# Patient Record
Sex: FEMALE | Race: WHITE | NOT HISPANIC OR LATINO | Employment: PART TIME | URBAN - NONMETROPOLITAN AREA
[De-identification: names, ages, dates, MRNs, and addresses within clinical notes are randomized per-mention and may not be internally consistent; named-entity substitution may affect disease eponyms.]

---

## 2020-05-06 ENCOUNTER — LAB (OUTPATIENT)
Dept: LAB | Facility: HOSPITAL | Age: 65
End: 2020-05-06

## 2020-05-06 ENCOUNTER — OFFICE VISIT (OUTPATIENT)
Dept: FAMILY MEDICINE CLINIC | Facility: CLINIC | Age: 65
End: 2020-05-06

## 2020-05-06 ENCOUNTER — TELEPHONE (OUTPATIENT)
Dept: FAMILY MEDICINE CLINIC | Facility: CLINIC | Age: 65
End: 2020-05-06

## 2020-05-06 VITALS
BODY MASS INDEX: 35.49 KG/M2 | SYSTOLIC BLOOD PRESSURE: 140 MMHG | DIASTOLIC BLOOD PRESSURE: 82 MMHG | WEIGHT: 200.3 LBS | HEIGHT: 63 IN | HEART RATE: 62 BPM | OXYGEN SATURATION: 97 %

## 2020-05-06 DIAGNOSIS — Z01.419 WELL WOMAN EXAM: ICD-10-CM

## 2020-05-06 DIAGNOSIS — N95.9 POSTMENOPAUSAL SYMPTOMS: ICD-10-CM

## 2020-05-06 DIAGNOSIS — L29.9 ITCHING: ICD-10-CM

## 2020-05-06 DIAGNOSIS — Z12.31 ENCOUNTER FOR SCREENING MAMMOGRAM FOR MALIGNANT NEOPLASM OF BREAST: ICD-10-CM

## 2020-05-06 DIAGNOSIS — G62.9 NEUROPATHY: ICD-10-CM

## 2020-05-06 DIAGNOSIS — M54.40 CHRONIC BILATERAL LOW BACK PAIN WITH SCIATICA, SCIATICA LATERALITY UNSPECIFIED: ICD-10-CM

## 2020-05-06 DIAGNOSIS — E66.01 SEVERE OBESITY (BMI 35.0-35.9 WITH COMORBIDITY) (HCC): Primary | ICD-10-CM

## 2020-05-06 DIAGNOSIS — Z86.39 HISTORY OF HYPOTHYROIDISM: ICD-10-CM

## 2020-05-06 DIAGNOSIS — I10 ESSENTIAL HYPERTENSION: Primary | ICD-10-CM

## 2020-05-06 DIAGNOSIS — G89.29 CHRONIC BILATERAL LOW BACK PAIN WITH SCIATICA, SCIATICA LATERALITY UNSPECIFIED: ICD-10-CM

## 2020-05-06 LAB
ALBUMIN SERPL-MCNC: 4.4 G/DL (ref 3.5–5.2)
ALBUMIN/GLOB SERPL: 1.3 G/DL
ALP SERPL-CCNC: 71 U/L (ref 39–117)
ALT SERPL W P-5'-P-CCNC: 30 U/L (ref 1–33)
ANION GAP SERPL CALCULATED.3IONS-SCNC: 13.1 MMOL/L (ref 5–15)
AST SERPL-CCNC: 34 U/L (ref 1–32)
BASOPHILS # BLD AUTO: 0.08 10*3/MM3 (ref 0–0.2)
BASOPHILS NFR BLD AUTO: 1 % (ref 0–1.5)
BILIRUB SERPL-MCNC: 0.3 MG/DL (ref 0.2–1.2)
BUN BLD-MCNC: 11 MG/DL (ref 8–23)
BUN/CREAT SERPL: 12.8 (ref 7–25)
CALCIUM SPEC-SCNC: 10 MG/DL (ref 8.6–10.5)
CHLORIDE SERPL-SCNC: 97 MMOL/L (ref 98–107)
CHOLEST SERPL-MCNC: 254 MG/DL (ref 0–200)
CO2 SERPL-SCNC: 27.9 MMOL/L (ref 22–29)
CREAT BLD-MCNC: 0.86 MG/DL (ref 0.57–1)
DEPRECATED RDW RBC AUTO: 40.2 FL (ref 37–54)
EOSINOPHIL # BLD AUTO: 0.1 10*3/MM3 (ref 0–0.4)
EOSINOPHIL NFR BLD AUTO: 1.2 % (ref 0.3–6.2)
ERYTHROCYTE [DISTWIDTH] IN BLOOD BY AUTOMATED COUNT: 13 % (ref 12.3–15.4)
GFR SERPL CREATININE-BSD FRML MDRD: 67 ML/MIN/1.73
GLOBULIN UR ELPH-MCNC: 3.3 GM/DL
GLUCOSE BLD-MCNC: 93 MG/DL (ref 65–99)
HCT VFR BLD AUTO: 40.2 % (ref 34–46.6)
HCV AB SER DONR QL: NORMAL
HDLC SERPL-MCNC: 47 MG/DL (ref 40–60)
HGB BLD-MCNC: 14.3 G/DL (ref 12–15.9)
IMM GRANULOCYTES # BLD AUTO: 0.02 10*3/MM3 (ref 0–0.05)
IMM GRANULOCYTES NFR BLD AUTO: 0.2 % (ref 0–0.5)
LDLC SERPL CALC-MCNC: 152 MG/DL (ref 0–100)
LDLC/HDLC SERPL: 3.23 {RATIO}
LYMPHOCYTES # BLD AUTO: 2.22 10*3/MM3 (ref 0.7–3.1)
LYMPHOCYTES NFR BLD AUTO: 27.2 % (ref 19.6–45.3)
MCH RBC QN AUTO: 30.8 PG (ref 26.6–33)
MCHC RBC AUTO-ENTMCNC: 35.6 G/DL (ref 31.5–35.7)
MCV RBC AUTO: 86.6 FL (ref 79–97)
MONOCYTES # BLD AUTO: 0.46 10*3/MM3 (ref 0.1–0.9)
MONOCYTES NFR BLD AUTO: 5.6 % (ref 5–12)
NEUTROPHILS # BLD AUTO: 5.28 10*3/MM3 (ref 1.7–7)
NEUTROPHILS NFR BLD AUTO: 64.8 % (ref 42.7–76)
NRBC BLD AUTO-RTO: 0.1 /100 WBC (ref 0–0.2)
PLATELET # BLD AUTO: 319 10*3/MM3 (ref 140–450)
PMV BLD AUTO: 11.3 FL (ref 6–12)
POTASSIUM BLD-SCNC: 4.2 MMOL/L (ref 3.5–5.2)
PROT SERPL-MCNC: 7.7 G/DL (ref 6–8.5)
RBC # BLD AUTO: 4.64 10*6/MM3 (ref 3.77–5.28)
SODIUM BLD-SCNC: 138 MMOL/L (ref 136–145)
TRIGL SERPL-MCNC: 277 MG/DL (ref 0–150)
TSH SERPL DL<=0.05 MIU/L-ACNC: 2.21 UIU/ML (ref 0.27–4.2)
VIT B12 BLD-MCNC: 708 PG/ML (ref 211–946)
VLDLC SERPL-MCNC: 55.4 MG/DL (ref 5–40)
WBC NRBC COR # BLD: 8.16 10*3/MM3 (ref 3.4–10.8)

## 2020-05-06 PROCEDURE — 84443 ASSAY THYROID STIM HORMONE: CPT

## 2020-05-06 PROCEDURE — 85025 COMPLETE CBC W/AUTO DIFF WBC: CPT

## 2020-05-06 PROCEDURE — 80053 COMPREHEN METABOLIC PANEL: CPT

## 2020-05-06 PROCEDURE — 99204 OFFICE O/P NEW MOD 45 MIN: CPT | Performed by: FAMILY MEDICINE

## 2020-05-06 PROCEDURE — 36415 COLL VENOUS BLD VENIPUNCTURE: CPT

## 2020-05-06 PROCEDURE — 80061 LIPID PANEL: CPT

## 2020-05-06 PROCEDURE — 82607 VITAMIN B-12: CPT

## 2020-05-06 PROCEDURE — 83036 HEMOGLOBIN GLYCOSYLATED A1C: CPT

## 2020-05-06 PROCEDURE — 86803 HEPATITIS C AB TEST: CPT

## 2020-05-06 RX ORDER — ESTRADIOL 2 MG/1
2 TABLET ORAL DAILY
COMMUNITY
End: 2020-05-06

## 2020-05-06 RX ORDER — ERGOCALCIFEROL 1.25 MG/1
CAPSULE ORAL
COMMUNITY
Start: 2016-02-01 | End: 2020-06-17

## 2020-05-06 RX ORDER — ATENOLOL 25 MG/1
TABLET ORAL
COMMUNITY
Start: 2010-07-01 | End: 2020-07-02 | Stop reason: SDUPTHER

## 2020-05-06 RX ORDER — ESTRADIOL 1 MG/1
1 TABLET ORAL DAILY
Qty: 30 TABLET | Refills: 0 | Status: SHIPPED | OUTPATIENT
Start: 2020-05-06 | End: 2020-06-05 | Stop reason: SDUPTHER

## 2020-05-06 RX ORDER — HYDROXYZINE HYDROCHLORIDE 25 MG/1
25 TABLET, FILM COATED ORAL NIGHTLY PRN
Qty: 30 TABLET | Refills: 2 | Status: SHIPPED | OUTPATIENT
Start: 2020-05-06 | End: 2020-05-06

## 2020-05-06 RX ORDER — HYDROCHLOROTHIAZIDE 12.5 MG/1
25 TABLET ORAL DAILY
COMMUNITY
End: 2020-05-11

## 2020-05-06 RX ORDER — ROPINIROLE 1 MG/1
TABLET, FILM COATED ORAL
COMMUNITY
Start: 2014-01-01 | End: 2020-05-11

## 2020-05-06 RX ORDER — ESTRADIOL 1 MG/1
1 TABLET ORAL DAILY
Qty: 30 TABLET | Refills: 0 | Status: SHIPPED | OUTPATIENT
Start: 2020-05-06 | End: 2020-05-06

## 2020-05-06 RX ORDER — HYDROXYZINE HYDROCHLORIDE 25 MG/1
25 TABLET, FILM COATED ORAL NIGHTLY PRN
Qty: 30 TABLET | Refills: 2 | Status: SHIPPED | OUTPATIENT
Start: 2020-05-06 | End: 2021-03-11

## 2020-05-06 NOTE — TELEPHONE ENCOUNTER
PT CALLED IN STATING THAT SHE NEEDS HER MEDICINE SENT TO [facility]. HER INSURANCE WILL NOT COVER MEDICATION THROUGH [facility]    PT CONTACT: [phone]    PT PHARMACY: [facility] - [place], [place] - [facility] - [phone]  - [phone] FX

## 2020-05-06 NOTE — PROGRESS NOTES
Subjective   Chief Complaint   Patient presents with   • [facility]     Ysabel Garcia is a 64 y.o. year old presenting to [facility] as new patient.      Additional Concerns:    Hypertension  Patient reports that her blood pressure is typically well controlled.  She checks her blood pressure at home periodically.  Her systolic blood pressure is usually between 120 and 125.  Her diastolic blood pressures usually around 80.  She currently is taking atenolol 25 mg daily and hydrochlorothiazide 12.5 mg daily.  She notes that she would prefer to be on less pills, and ask if there is some way to arrange that.  Patient states that she was on some combination pill with hydrochlorothiazide in the past, however she cannot remember which 1 it was, and she thinks she developed a rash from this medication.  She denies any history of heart failure or arrhythmias.    Paresthesias in lower extremities  Patient currently taking Requip 1 mg daily at bedtime for restless legs.  Notes that this medication helps.  She has tingling and burning in her lower extremities, but does not feel like this is related to the restless legs.  She is tried taking vitamin B12, but has not noticed a difference.  She does use lavender oil in her legs, and feels like this helps.  Her concern with the burning pain is that she may have diabetes, as she has a strong family history of this disease process.  She says that previous primary care physician was at one time watching her blood sugar levels.  Denies any diagnosis of prediabetes or diabetes that she knows of.    Chronic low back pain  Patient has chronic low back pain.  She says it is combination of spinal stenosis and degenerative disc disease.  She has been imaged with MRIs in the past.  Notes that prior PCP used opioid pain medication to help with her low back pain.  She has been out of this medication for the last month.  She notes that she still has some bad days, but is doing okay without  it.  She only took the medication a few times a week, not daily.  She would be interested in a referral to pain management, as her mother received injections and these seem to help her.    Menopausal symptoms  Patient had hysterectomy in 2016 for abnormal bleeding.  She says that about 3 years ago she was started on estradiol 1 mg daily for menopausal symptoms, and this was increased to 2 mg daily for better control of those symptoms last year.  Symptoms are controlled with the 2 mg dose.  She requests refill of this medication today.      Past Medical History:   Diagnosis Date   • Degenerative disc disease, lumbar    • Hypertension    • Restless legs syndrome (RLS)    • Spinal stenosis        Past Surgical History:   Procedure Laterality Date   • HYSTERECTOMY  2016    For abnormal bleeding, no malignancy       Medications:  Current Outpatient Medications on File Prior to Visit   Medication Sig Dispense Refill   • atenolol (TENORMIN) 25 MG tablet      • ergocalciferol (ERGOCALCIFEROL) 1.25 MG (72945 UT) capsule      • hydroCHLOROthiazide (HYDRODIURIL) 12.5 MG tablet      • rOPINIRole (REQUIP) 1 MG tablet      • [DISCONTINUED] estradiol (Estrace) 2 MG tablet Take 2 mg by mouth Daily.       No current facility-administered medications on file prior to visit.        Allergies   Allergen Reactions   • Sulfa Antibiotics Rash       Family History   Problem Relation Age of Onset   • Breast cancer Mother 74   • Diabetes Mother    • Hypertension Mother    • Diabetes Father    • Hypertension Father    • Heart disease Father    • Skin cancer Father    • Hypertension Sister    • Diabetes Sister    • Hypertension Brother    • Diabetes Brother        Social History     Socioeconomic History   • Marital status:      Spouse name: Not on file   • Number of children: Not on file   • Years of education: Not on file   • Highest education level: Not on file   Tobacco Use   • Smoking status: Never Smoker   Substance and Sexual  "Activity   • Alcohol use: Yes     Comment: 1 drink, 2-3 times per week       Health Maintenance   Topic Date Due   • TDAP/TD VACCINES (1 - Tdap) 06[birth year]/1968   • ZOSTER VACCINE (1 of 2) 06/05/2007   • PAP SMEAR  05/06/2020   • COLONOSCOPY  05/06/2020   • INFLUENZA VACCINE  08/01/2020   • MAMMOGRAM  06/01/2021       Problem list was reviewed and updated as appropriate.      Review of Systems   Constitutional: Negative for appetite change and unexpected weight change.   HENT: Negative for voice change.    Eyes: Negative for visual disturbance.   Respiratory: Negative for chest tightness and shortness of breath.    Cardiovascular: Negative for chest pain and leg swelling.   Gastrointestinal: Negative for abdominal pain, constipation and diarrhea.   Endocrine: Negative for cold intolerance and heat intolerance.   Genitourinary: Negative for difficulty urinating.   Musculoskeletal: Positive for back pain. Negative for myalgias.   Skin: Negative for rash.   Neurological: Negative for numbness and headaches.   Psychiatric/Behavioral: Negative for dysphoric mood and sleep disturbance.         Objective     /82 (BP Location: Left arm)   Pulse 62   Ht 160 cm (63\")   Wt 90.9 kg (200 lb 4.8 oz)   SpO2 97%   BMI 35.48 kg/m²   Physical Exam   Constitutional: She is oriented to person, place, and time. She appears well-developed and well-nourished. No distress.   HENT:   Head: Normocephalic and atraumatic.   Nose: Nose normal.   Mouth/Throat: Oropharynx is clear and moist.   Eyes: Pupils are equal, round, and reactive to light. Conjunctivae and EOM are normal.   Neck: Normal range of motion. Neck supple. No thyromegaly present.   Cardiovascular: Normal rate, regular rhythm and normal heart sounds.   No murmur heard.  Pulmonary/Chest: Effort normal and breath sounds normal. No respiratory distress. She has no wheezes. She has no rales.   Abdominal: Soft. Bowel sounds are normal. She exhibits no distension. There is no " tenderness.   Musculoskeletal: She exhibits edema (Trace, nonpitting).        Right elbow: Tenderness found. Lateral epicondyle tenderness noted.        Cervical back: She exhibits no tenderness, no swelling and no deformity.        Lumbar back: She exhibits decreased range of motion (Minimal). She exhibits no tenderness, no bony tenderness, no swelling and no deformity.   Lymphadenopathy:     She has no cervical adenopathy.   Neurological: She is alert and oriented to person, place, and time.   Skin: Skin is warm and dry. No rash noted.   Psychiatric: She has a normal mood and affect.   Vitals reviewed.      Lab Review   No data reviewed    Imaging  No data reviewed         Assessment/Plan   Ysabel was seen today for establish care.    Diagnoses and all orders for this visit:    Essential hypertension  Blood pressure borderline today, however patient reports that it is normally very well controlled.  She is currently taking atenolol 25 mg daily and hydrochlorothiazide 12.5 mg daily.  Patient would like to only take one blood pressure pill if possible.  Patient has 2 months of the atenolol left at home.  Advised that we would wean off the medication when she got to the last month, as to avoid any rebound hypertension.  At that time, we would plan to start her on another medication, most likely lisinopril unless noted in her previous records that she had a problem with this medication.  Once we establish a stable dose of both the lisinopril and hydrochlorothiazide, we will switch patient to combination tablet.    History of hypothyroidism  Neuropathy  Patient notes at one time she was taking thyroid supplementation.  She believes that lab work showed some abnormalities at that time.  She is not currently taking levothyroxine, but did not have any problem with the medication when taking previously.  She is taking some natural supplement to try and help her thyroid, but she cannot remember the name of this medication.   Will check TSH today as thyroid abnormalities can cause neuropathy in the lower extremities.  Will also check vitamin B12 level, as patient has been supplementing this.  Hemoglobin A1c also ordered to ensure no diabetes given patient's strong family history of diabetes and current BMI of 35.5.  -     TSH; Future  -     Vitamin B12; Future    Chronic bilateral low back pain with sciatica, sciatica laterality unspecified  Patient does not wish to be on chronic opioid medication, and declines prescription today.  She would like pain management referral to discuss other options of managing her pain, including but not limited to injections.  Will refer to pain management at this time.  Advised that consultation appointment may be via telemedicine or in person, depending on how this provider is currently doing visits.  Patient also advised that appointment for consultation may take additional amount of time due to the current coronavirus pandemic.  -     Ambulatory Referral to Pain Management    Well woman exam  Health maintenance items reviewed.  Records will be requested from patient's previous PCP.  We will go ahead and get lab work today, as patient is fasting.  Will call patient with results when available.  Patient counseled on Shingrix vaccine, and she will check with her pharmacy to have this done.  Mammogram ordered to be done, and is due 6/20/2020.  Patient gets yearly mammograms.  Colonoscopy is up-to-date.  Last done 2 to 3 years ago, and patient reports was normal.  Likely will not be due for another 10 years.  Will await records from previous PCP before entering into chart.  Given that patient had a hysterectomy for noncancerous reasons, Pap smear is no longer recommended.  -     Lipid Panel; Future  -     CBC & Differential; Future  -     Comprehensive Metabolic Panel; Future  -     Hepatitis C antibody; Future    Encounter for screening mammogram for malignant neoplasm of breast  -     Mammo Screening  Digital Tomosynthesis Bilateral With CAD; Future    Itching  Patient has sensitive skin, and occasionally has itching at nighttime after using a new soap/detergent/lotion/etc.  Will provide prescription for hydroxyzine to be used periodically as needed at bedtime for itching.  -     hydrOXYzine (ATARAX) 25 MG tablet; Take 1 tablet by mouth At Night As Needed for Itching.    Postmenopausal symptoms  Patient on hormone replacement therapy with estradiol.  Discussed the importance of using the lowest effective dose for the shortest amount of time of the hormonal therapy.  She is not having any hot flashes or other postmenopausal symptoms on current therapy.  We will try to decrease down to 1 mg daily.  Patient advised to call if she begins having symptoms again and this dose can be increased back to 2 mg daily.  -     estradiol (ESTRACE) 1 MG tablet; Take 1 tablet by mouth Daily.      Follow-up in 6 weeks for recheck or sooner if needed.           This document has been electronically signed by Sharifa Klein MD on May 6, 2020 13:24

## 2020-05-07 ENCOUNTER — TELEPHONE (OUTPATIENT)
Dept: FAMILY MEDICINE CLINIC | Facility: CLINIC | Age: 65
End: 2020-05-07

## 2020-05-07 LAB — HBA1C MFR BLD: 5.44 % (ref 4.8–5.6)

## 2020-05-07 NOTE — TELEPHONE ENCOUNTER
Spoke with patient regarding lab results.  CBC, CMP, vitamin B12, TSH, hemoglobin A1c and hepatitis C antibody were okay.  Lipid panel was abnormal.  Elevated total cholesterol, triglycerides, LDL and VLDL.  Used lipid panel to calculate patient's 10-year risk of ASCVD.  She is an intermediate risk at 8.3%.  Discussed with patient that a moderate intensity statin is recommended.  Risk and benefits of the medication discussed.  She has some reservations about starting this medication, as her mother did not tolerate statins well.  She would like to think about it, and is open to discussing again at her visit in 6 weeks.  She has tried red yeast rice supplement in the past, and did not tolerate this well.  Patient asked what we should do about her neuropathy, since it did not seem to be associated with any of the labs that we ordered.  Discussed with patient that next steps would likely be a neurology referral with testing of the peripheral nerves.  She is agreeable.  We will plan to do this at next office visit as well.  Note made in patient's chart. Kiet Klein

## 2020-05-12 RX ORDER — KELP 150 MCG
150 TABLET ORAL DAILY
Qty: 30 TABLET
Start: 2020-05-12 | End: 2021-03-11

## 2020-05-12 RX ORDER — CETIRIZINE HYDROCHLORIDE 10 MG/1
10 TABLET ORAL DAILY
Start: 2020-05-12 | End: 2021-03-11 | Stop reason: SDUPTHER

## 2020-05-12 RX ORDER — DIPHENHYDRAMINE HCL 25 MG
25 CAPSULE ORAL EVERY 6 HOURS PRN
Start: 2020-05-12 | End: 2020-07-29

## 2020-05-12 RX ORDER — MULTIVIT WITH MINERALS/LUTEIN
1 TABLET ORAL DAILY
Start: 2020-05-12 | End: 2021-05-12

## 2020-05-12 RX ORDER — UREA 10 %
800 LOTION (ML) TOPICAL DAILY
Start: 2020-05-12 | End: 2020-09-30

## 2020-05-12 RX ORDER — MULTIVIT-MIN/IRON/FOLIC ACID/K 18-600-40
500 CAPSULE ORAL DAILY
Start: 2020-05-12 | End: 2020-07-29

## 2020-06-05 DIAGNOSIS — N95.9 POSTMENOPAUSAL SYMPTOMS: ICD-10-CM

## 2020-06-05 RX ORDER — ESTRADIOL 1 MG/1
1 TABLET ORAL DAILY
Qty: 30 TABLET | Refills: 1 | Status: SHIPPED | OUTPATIENT
Start: 2020-06-05 | End: 2020-06-17 | Stop reason: SDUPTHER

## 2020-06-17 ENCOUNTER — OFFICE VISIT (OUTPATIENT)
Dept: FAMILY MEDICINE CLINIC | Facility: CLINIC | Age: 65
End: 2020-06-17

## 2020-06-17 VITALS
WEIGHT: 202.5 LBS | BODY MASS INDEX: 35.88 KG/M2 | DIASTOLIC BLOOD PRESSURE: 80 MMHG | HEART RATE: 63 BPM | HEIGHT: 63 IN | OXYGEN SATURATION: 98 % | SYSTOLIC BLOOD PRESSURE: 128 MMHG

## 2020-06-17 DIAGNOSIS — I10 ESSENTIAL HYPERTENSION: Primary | ICD-10-CM

## 2020-06-17 DIAGNOSIS — E78.2 MIXED HYPERLIPIDEMIA: ICD-10-CM

## 2020-06-17 DIAGNOSIS — N95.9 POSTMENOPAUSAL SYMPTOMS: ICD-10-CM

## 2020-06-17 DIAGNOSIS — R19.7 DIARRHEA, UNSPECIFIED TYPE: ICD-10-CM

## 2020-06-17 PROCEDURE — 99214 OFFICE O/P EST MOD 30 MIN: CPT | Performed by: FAMILY MEDICINE

## 2020-06-17 RX ORDER — LISINOPRIL 10 MG/1
10 TABLET ORAL DAILY
Qty: 14 TABLET | Refills: 0 | Status: SHIPPED | OUTPATIENT
Start: 2020-06-17 | End: 2020-07-02

## 2020-06-17 RX ORDER — LISINOPRIL AND HYDROCHLOROTHIAZIDE 25; 20 MG/1; MG/1
1 TABLET ORAL DAILY
Qty: 30 TABLET | Refills: 2 | Status: SHIPPED | OUTPATIENT
Start: 2020-07-01 | End: 2020-06-24 | Stop reason: SDUPTHER

## 2020-06-17 RX ORDER — ERGOCALCIFEROL 1.25 MG/1
50000 CAPSULE ORAL WEEKLY
Qty: 8 CAPSULE | Refills: 0 | Status: SHIPPED | OUTPATIENT
Start: 2020-06-17 | End: 2020-11-11

## 2020-06-17 RX ORDER — CHOLESTYRAMINE LIGHT 4 G/5.7G
4 POWDER, FOR SUSPENSION ORAL 2 TIMES DAILY
Qty: 60 EACH | Refills: 2 | Status: SHIPPED | OUTPATIENT
Start: 2020-06-17 | End: 2020-06-22 | Stop reason: SDUPTHER

## 2020-06-17 RX ORDER — ROPINIROLE 0.5 MG/1
.5-1 TABLET, FILM COATED ORAL NIGHTLY
Qty: 60 TABLET | Refills: 2 | Status: SHIPPED | OUTPATIENT
Start: 2020-06-17 | End: 2020-06-22 | Stop reason: SDUPTHER

## 2020-06-17 RX ORDER — ESTRADIOL 2 MG/1
2 TABLET ORAL DAILY
Qty: 30 TABLET | Refills: 2 | Status: SHIPPED | OUTPATIENT
Start: 2020-06-17 | End: 2020-06-22 | Stop reason: SDUPTHER

## 2020-06-17 NOTE — PROGRESS NOTES
"Follow Up Office Visit          Ysabel Garcia is a 65 y.o. female that presents today for   Chief Complaint   Patient presents with   • Follow-up     Hypertension       HPI    Hypertension  Blood pressure has been controlled.  She is taking atenolol 25 mg daily and hydrochlorothiazide 12.5 mg daily.  Had discussed weaning off of atenolol in the past to help decrease patient's pill burden.  Plan is to switch to lisinopril - HCTZ combo.   Patient is down to her last couple of weeks of atenolol, so is ready to make the switch at this time.    Hot flashes  Patient having hot flashes that have become worse since decreasing estradiol to 1 mg daily.  She would like to go back to her dose of 2 mg at this time    Diarrhea  Intermittently has been a problem for her since cholecystectomy.  Has been having more diarrhea recently.  She has been taking cholestyramine in the past, and notes that this medication worked well for her.  Patient requesting refill today.      The following portions of the patient's history were reviewed and updated as appropriate: allergies, current medications, past medical history and problem list.    Review of Systems   Constitutional: Negative for chills and fever.   HENT: Negative for congestion and sinus pain.    Respiratory: Negative for cough and shortness of breath.    Cardiovascular: Negative for chest pain and leg swelling.   Gastrointestinal: Positive for diarrhea. Negative for abdominal pain, nausea and vomiting.   Endocrine: Positive for heat intolerance.   Musculoskeletal: Negative for back pain, gait problem and joint swelling.   Skin: Negative for rash.   Neurological: Negative for weakness and headaches.   Psychiatric/Behavioral: Negative for dysphoric mood and sleep disturbance.           Vitals:    06/17/20 1048   BP: 128/80   Pulse: 63   SpO2: 98%   Weight: 91.9 kg (202 lb 8 oz)   Height: 160 cm (63\")     Body mass index is 35.87 kg/m².    Physical Exam   Constitutional: She is " oriented to person, place, and time. She appears well-developed and well-nourished. No distress.   HENT:   Head: Normocephalic and atraumatic.   Eyes: EOM are normal.   Neck: Neck supple.   Cardiovascular: Normal rate, regular rhythm and normal heart sounds.   No murmur heard.  Pulmonary/Chest: Effort normal and breath sounds normal. No respiratory distress. She has no wheezes. She has no rales.   Abdominal: Soft. There is no tenderness.   Musculoskeletal: She exhibits no edema.   Neurological: She is alert and oriented to person, place, and time.   Skin: Skin is warm and dry. No rash noted.   Psychiatric: She has a normal mood and affect.   Vitals reviewed.      Assessment/Plan   Ysabel was seen today for follow-up.    Diagnoses and all orders for this visit:    Essential hypertension  Will need to wean atenolol gradually to decrease risk of rebound hypertension  Regimen to switch blood pressure medications as below:    Week 1  Take atenolol 12.5 mg (1/2 tab) daily  Start lisinopril 10 mg daily  Continue HCTZ 25 mg daily  Check Blood Pressure regularly    Week 2  Take atenolol 12.5 mg (1/2 tab) every other day  Continue lisinopril 10 mg daily  Continue HCTZ 25 mg daily    Week 3  Discontinue atenolol  Start lisinopril-HCTZ 20-25 mg daily  Check blood pressure regularly    Patient given this information as part of after visit summary  Given parameters for when to call with elevated or low BP  Patient voiced understanding of these instructions    -     lisinopril-hydrochlorothiazide (PRINZIDE,ZESTORETIC) 20-25 MG per tablet; Take 1 tablet by mouth Daily.  -     lisinopril (PRINIVIL,ZESTRIL) 10 MG tablet; Take 1 tablet by mouth Daily for 14 days.    Mixed hyperlipidemia  Patient does not wish to start a statin at this time  Discussed that 10 year risk of ASCVD is 8.3%  Cholestyramine can help with her cholesterol levels some, but does not have quite the same cardioprotective effect  -     cholestyramine light 4 g  packet; Take 1 packet by mouth 2 (Two) Times a Day.    Diarrhea, unspecified type  Diarrhea intermittently status post cholecystectomy  Will refill cholestyramine  -     cholestyramine light 4 g packet; Take 1 packet by mouth 2 (Two) Times a Day.    Postmenopausal symptoms  Postmenopausal symptoms not well controlled with decreased dose of estradiol  Will increase back to 2 mg daily at this time  -     estradiol (ESTRACE) 2 MG tablet; Take 1 tablet by mouth Daily for 90 days.    Other orders  Refills of other medications provided  -     rOPINIRole (REQUIP) 0.5 MG tablet; Take 1-2 tablets by mouth Every Night.  -     vitamin D (ERGOCALCIFEROL) 1.25 MG (78565 UT) capsule capsule; Take 1 capsule by mouth 1 (One) Time Per Week.        Return in about 6 weeks (around 7/29/2020) for Recheck HTN and GYN exam.        This document has been electronically signed by Sharifa Klein MD on June 17, 2020 10:57

## 2020-06-17 NOTE — PATIENT INSTRUCTIONS
Week 1  Take atenolol 12.5 mg (1/2 tab) daily  Start lisinopril 10 mg daily  Continue HCTZ 25 mg daily  Check Blood Pressure regularly    Week 2  Take atenolol 12.5 mg (1/2 tab) every other day  Continue lisinopril 10 mg daily  Continue HCTZ 25 mg daily    Week 3  Discontinue atenolol  Start lisinopril-HCTZ 20-25 mg daily  Check blood pressure regularly      Call if BP > 160/110 or < 90/60 and hold medications

## 2020-06-22 DIAGNOSIS — R19.7 DIARRHEA, UNSPECIFIED TYPE: ICD-10-CM

## 2020-06-22 DIAGNOSIS — N95.9 POSTMENOPAUSAL SYMPTOMS: ICD-10-CM

## 2020-06-22 DIAGNOSIS — E78.2 MIXED HYPERLIPIDEMIA: ICD-10-CM

## 2020-06-22 RX ORDER — ESTRADIOL 2 MG/1
2 TABLET ORAL DAILY
Qty: 90 TABLET | Refills: 1 | Status: SHIPPED | OUTPATIENT
Start: 2020-06-22 | End: 2020-09-20

## 2020-06-22 RX ORDER — CHOLESTYRAMINE LIGHT 4 G/5.7G
4 POWDER, FOR SUSPENSION ORAL 2 TIMES DAILY
Qty: 180 PACKET | Refills: 1 | Status: SHIPPED | OUTPATIENT
Start: 2020-06-22 | End: 2022-09-12

## 2020-06-22 RX ORDER — ROPINIROLE 0.5 MG/1
.5-1 TABLET, FILM COATED ORAL NIGHTLY
Qty: 180 TABLET | Refills: 1 | Status: SHIPPED | OUTPATIENT
Start: 2020-06-22 | End: 2020-09-30

## 2020-06-24 ENCOUNTER — TELEPHONE (OUTPATIENT)
Dept: FAMILY MEDICINE CLINIC | Facility: CLINIC | Age: 65
End: 2020-06-24

## 2020-06-24 DIAGNOSIS — I10 ESSENTIAL HYPERTENSION: ICD-10-CM

## 2020-06-24 RX ORDER — LISINOPRIL AND HYDROCHLOROTHIAZIDE 25; 20 MG/1; MG/1
1 TABLET ORAL DAILY
Qty: 90 TABLET | Refills: 2 | Status: SHIPPED | OUTPATIENT
Start: 2020-07-01 | End: 2020-07-02

## 2020-06-24 NOTE — TELEPHONE ENCOUNTER
Please call and let patient know that repeat evaluation of left breast with mammogram was normal.  Repeat screening will be needed in 1 year.  ThanksLUANN

## 2020-07-02 ENCOUNTER — PATIENT MESSAGE (OUTPATIENT)
Dept: FAMILY MEDICINE CLINIC | Facility: CLINIC | Age: 65
End: 2020-07-02

## 2020-07-02 DIAGNOSIS — L29.9 ITCHING: ICD-10-CM

## 2020-07-02 RX ORDER — HYDROCHLOROTHIAZIDE 25 MG/1
25 TABLET ORAL DAILY
Qty: 90 TABLET | Refills: 1 | Status: SHIPPED | OUTPATIENT
Start: 2020-08-02 | End: 2020-07-02

## 2020-07-02 RX ORDER — HYDROCHLOROTHIAZIDE 12.5 MG/1
12.5 TABLET ORAL DAILY
Qty: 90 TABLET | Refills: 1 | Status: SHIPPED | OUTPATIENT
Start: 2020-07-02 | End: 2020-07-29

## 2020-07-02 RX ORDER — HYDROCHLOROTHIAZIDE 12.5 MG/1
12.5 TABLET ORAL DAILY
Qty: 30 TABLET | Refills: 0 | Status: SHIPPED | OUTPATIENT
Start: 2020-07-02 | End: 2020-07-07 | Stop reason: SDUPTHER

## 2020-07-02 RX ORDER — HYDROCHLOROTHIAZIDE 25 MG/1
25 TABLET ORAL DAILY
Qty: 30 TABLET | Refills: 0 | Status: SHIPPED | OUTPATIENT
Start: 2020-07-02 | End: 2020-07-02

## 2020-07-02 RX ORDER — ATENOLOL 25 MG/1
25 TABLET ORAL DAILY
Qty: 30 TABLET | Refills: 0 | Status: SHIPPED | OUTPATIENT
Start: 2020-07-02 | End: 2021-01-20 | Stop reason: SDUPTHER

## 2020-07-02 RX ORDER — ATENOLOL 25 MG/1
25 TABLET ORAL DAILY
Qty: 90 TABLET | Refills: 1 | Status: SHIPPED | OUTPATIENT
Start: 2020-08-02 | End: 2020-07-29 | Stop reason: SDUPTHER

## 2020-07-02 NOTE — TELEPHONE ENCOUNTER
Spoke with patient.  She notes that she has taken 2 doses of new medication, lisinopril.  She has had tongue numbness and tingling that started this morning.  No tongue or lip swelling.  No difficulties breathing at this time.  She does have some upper abdominal pain that started about the same time.  Says that the abdominal pain is getting better throughout the day.  Advised the patient discontinue lisinopril.  Since she is going have to take 2 tablets anyway, will keep patient on previous antihypertensive therapy which controlled her blood pressure.  Refills provided of atenolol 25 mg daily and hydrochlorothiazide 12.5 mg daily.  She voiced understanding.  Lisinopril added to medication allergy.  30 day supply of medication sent to local pharmacy and 90 day supply with refill sent to patient's mail order pharmacy. Patient advised on when to be seen in the emergency room.  Kiet Klein

## 2020-07-07 ENCOUNTER — TELEPHONE (OUTPATIENT)
Dept: FAMILY MEDICINE CLINIC | Facility: CLINIC | Age: 65
End: 2020-07-07

## 2020-07-07 NOTE — TELEPHONE ENCOUNTER
Caller: [facility] - [place], [place] - [address] - [phone] [facility] [phone] FX    Relationship to patient: Pharmacy    Best call back number: [phone] REF# [id number]    Patient is needing: JORGE LUIS HAS A QUESTION ABOUT PATIENT'S MEDICATION FOR HYDROCHLOROTHIAZIDE.

## 2020-07-23 ENCOUNTER — TELEPHONE (OUTPATIENT)
Dept: FAMILY MEDICINE CLINIC | Facility: CLINIC | Age: 65
End: 2020-07-23

## 2020-07-23 NOTE — TELEPHONE ENCOUNTER
LAVON [employer]ODRIGUE[employer]    ADVISED PATIENT IS ENROLLED IN CASE MANAGEMENT PROGRAM AND IF THERE ARE ANY QUESTION SHE CAN BE REACHED -[phone] ([phone])

## 2020-07-29 ENCOUNTER — OFFICE VISIT (OUTPATIENT)
Dept: FAMILY MEDICINE CLINIC | Facility: CLINIC | Age: 65
End: 2020-07-29

## 2020-07-29 VITALS
BODY MASS INDEX: 34.96 KG/M2 | WEIGHT: 197.3 LBS | SYSTOLIC BLOOD PRESSURE: 130 MMHG | OXYGEN SATURATION: 98 % | HEIGHT: 63 IN | HEART RATE: 64 BPM | DIASTOLIC BLOOD PRESSURE: 78 MMHG

## 2020-07-29 DIAGNOSIS — I10 ESSENTIAL HYPERTENSION: Primary | ICD-10-CM

## 2020-07-29 PROCEDURE — 99213 OFFICE O/P EST LOW 20 MIN: CPT | Performed by: FAMILY MEDICINE

## 2020-07-29 RX ORDER — LOSARTAN POTASSIUM 25 MG/1
25 TABLET ORAL DAILY
Qty: 30 TABLET | Refills: 0 | Status: SHIPPED | OUTPATIENT
Start: 2020-07-29 | End: 2020-07-29

## 2020-07-29 RX ORDER — HYDROCHLOROTHIAZIDE 25 MG/1
25 TABLET ORAL DAILY
COMMUNITY
End: 2020-09-08 | Stop reason: SDUPTHER

## 2020-09-07 ENCOUNTER — PATIENT MESSAGE (OUTPATIENT)
Dept: FAMILY MEDICINE CLINIC | Facility: CLINIC | Age: 65
End: 2020-09-07

## 2020-09-07 DIAGNOSIS — I10 ESSENTIAL HYPERTENSION: Primary | ICD-10-CM

## 2020-09-08 RX ORDER — VITAMIN B COMPLEX
1000 TABLET ORAL DAILY
Qty: 90 TABLET | Refills: 1 | Status: SHIPPED | OUTPATIENT
Start: 2020-09-08 | End: 2020-11-11

## 2020-09-08 RX ORDER — HYDROCHLOROTHIAZIDE 25 MG/1
25 TABLET ORAL DAILY
Qty: 90 TABLET | Refills: 1 | Status: SHIPPED | OUTPATIENT
Start: 2020-09-08 | End: 2021-03-11 | Stop reason: SDUPTHER

## 2020-09-29 RX ORDER — FOLIC ACID 1 MG/1
1 TABLET ORAL DAILY
COMMUNITY
End: 2020-09-30

## 2020-09-30 ENCOUNTER — OFFICE VISIT (OUTPATIENT)
Dept: FAMILY MEDICINE CLINIC | Facility: CLINIC | Age: 65
End: 2020-09-30

## 2020-09-30 ENCOUNTER — LAB (OUTPATIENT)
Dept: LAB | Facility: HOSPITAL | Age: 65
End: 2020-09-30

## 2020-09-30 VITALS
SYSTOLIC BLOOD PRESSURE: 136 MMHG | DIASTOLIC BLOOD PRESSURE: 78 MMHG | BODY MASS INDEX: 35.37 KG/M2 | RESPIRATION RATE: 20 BRPM | WEIGHT: 199.6 LBS | HEART RATE: 63 BPM | HEIGHT: 63 IN | OXYGEN SATURATION: 97 %

## 2020-09-30 DIAGNOSIS — G25.81 RESTLESS LEGS SYNDROME: ICD-10-CM

## 2020-09-30 DIAGNOSIS — E55.9 VITAMIN D DEFICIENCY: ICD-10-CM

## 2020-09-30 DIAGNOSIS — I10 ESSENTIAL HYPERTENSION: Primary | ICD-10-CM

## 2020-09-30 DIAGNOSIS — M77.12 LATERAL EPICONDYLITIS OF LEFT ELBOW: ICD-10-CM

## 2020-09-30 LAB
25(OH)D3 SERPL-MCNC: 39.5 NG/ML (ref 30–100)
ANION GAP SERPL CALCULATED.3IONS-SCNC: 12.6 MMOL/L (ref 5–15)
BASOPHILS # BLD AUTO: 0.05 10*3/MM3 (ref 0–0.2)
BASOPHILS NFR BLD AUTO: 0.6 % (ref 0–1.5)
BUN SERPL-MCNC: 13 MG/DL (ref 8–23)
BUN/CREAT SERPL: 16.3 (ref 7–25)
CALCIUM SPEC-SCNC: 10.2 MG/DL (ref 8.6–10.5)
CHLORIDE SERPL-SCNC: 99 MMOL/L (ref 98–107)
CO2 SERPL-SCNC: 25.4 MMOL/L (ref 22–29)
CREAT SERPL-MCNC: 0.8 MG/DL (ref 0.57–1)
DEPRECATED RDW RBC AUTO: 43 FL (ref 37–54)
EOSINOPHIL # BLD AUTO: 0.07 10*3/MM3 (ref 0–0.4)
EOSINOPHIL NFR BLD AUTO: 0.9 % (ref 0.3–6.2)
ERYTHROCYTE [DISTWIDTH] IN BLOOD BY AUTOMATED COUNT: 13.1 % (ref 12.3–15.4)
FERRITIN SERPL-MCNC: 130 NG/ML (ref 13–150)
GFR SERPL CREATININE-BSD FRML MDRD: 72 ML/MIN/1.73
GLUCOSE SERPL-MCNC: 82 MG/DL (ref 65–99)
HCT VFR BLD AUTO: 40.5 % (ref 34–46.6)
HGB BLD-MCNC: 13.9 G/DL (ref 12–15.9)
IMM GRANULOCYTES # BLD AUTO: 0.02 10*3/MM3 (ref 0–0.05)
IMM GRANULOCYTES NFR BLD AUTO: 0.3 % (ref 0–0.5)
IRON 24H UR-MRATE: 104 MCG/DL (ref 37–145)
IRON SATN MFR SERPL: 20 % (ref 20–50)
LYMPHOCYTES # BLD AUTO: 2.29 10*3/MM3 (ref 0.7–3.1)
LYMPHOCYTES NFR BLD AUTO: 28.7 % (ref 19.6–45.3)
MCH RBC QN AUTO: 31 PG (ref 26.6–33)
MCHC RBC AUTO-ENTMCNC: 34.3 G/DL (ref 31.5–35.7)
MCV RBC AUTO: 90.4 FL (ref 79–97)
MONOCYTES # BLD AUTO: 0.43 10*3/MM3 (ref 0.1–0.9)
MONOCYTES NFR BLD AUTO: 5.4 % (ref 5–12)
NEUTROPHILS NFR BLD AUTO: 5.13 10*3/MM3 (ref 1.7–7)
NEUTROPHILS NFR BLD AUTO: 64.1 % (ref 42.7–76)
NRBC BLD AUTO-RTO: 0 /100 WBC (ref 0–0.2)
PLATELET # BLD AUTO: 286 10*3/MM3 (ref 140–450)
PMV BLD AUTO: 11.4 FL (ref 6–12)
POTASSIUM SERPL-SCNC: 4.3 MMOL/L (ref 3.5–5.2)
RBC # BLD AUTO: 4.48 10*6/MM3 (ref 3.77–5.28)
SODIUM SERPL-SCNC: 137 MMOL/L (ref 136–145)
TIBC SERPL-MCNC: 511 MCG/DL (ref 298–536)
TRANSFERRIN SERPL-MCNC: 343 MG/DL (ref 200–360)
WBC # BLD AUTO: 7.99 10*3/MM3 (ref 3.4–10.8)

## 2020-09-30 PROCEDURE — 84466 ASSAY OF TRANSFERRIN: CPT

## 2020-09-30 PROCEDURE — 80048 BASIC METABOLIC PNL TOTAL CA: CPT

## 2020-09-30 PROCEDURE — 82306 VITAMIN D 25 HYDROXY: CPT

## 2020-09-30 PROCEDURE — 82728 ASSAY OF FERRITIN: CPT

## 2020-09-30 PROCEDURE — 83540 ASSAY OF IRON: CPT

## 2020-09-30 PROCEDURE — 85025 COMPLETE CBC W/AUTO DIFF WBC: CPT

## 2020-09-30 PROCEDURE — 36415 COLL VENOUS BLD VENIPUNCTURE: CPT

## 2020-09-30 PROCEDURE — 99214 OFFICE O/P EST MOD 30 MIN: CPT | Performed by: FAMILY MEDICINE

## 2020-09-30 RX ORDER — LOSARTAN POTASSIUM 25 MG/1
TABLET ORAL
COMMUNITY
Start: 2020-07-29 | End: 2020-10-13

## 2020-09-30 RX ORDER — PENICILLIN V POTASSIUM 500 MG/1
TABLET ORAL
COMMUNITY
Start: 2020-07-23 | End: 2020-11-11

## 2020-09-30 RX ORDER — ROPINIROLE 1 MG/1
1.5 TABLET, FILM COATED ORAL NIGHTLY
Qty: 45 TABLET | Refills: 2 | Status: SHIPPED | OUTPATIENT
Start: 2020-09-30 | End: 2021-03-11 | Stop reason: SDUPTHER

## 2020-09-30 RX ORDER — LISINOPRIL AND HYDROCHLOROTHIAZIDE 25; 20 MG/1; MG/1
TABLET ORAL
COMMUNITY
Start: 2020-07-10 | End: 2020-09-30

## 2020-09-30 RX ORDER — UBIDECARENONE 100 MG
CAPSULE ORAL
COMMUNITY
Start: 2020-09-08 | End: 2021-03-11 | Stop reason: SDUPTHER

## 2020-10-01 ENCOUNTER — TELEPHONE (OUTPATIENT)
Dept: FAMILY MEDICINE CLINIC | Facility: CLINIC | Age: 65
End: 2020-10-01

## 2020-10-01 NOTE — TELEPHONE ENCOUNTER
Please call and let patient know that vitamin D is normal.  Continue daily supplement.  Iron studies, electrolytes (sodium/potassium) and CBC were ok.  No lab abnormalities that are contributing to her restless legs.  She should try the increased Requip as discussed, and follow up in 6 weeks at next appointment or sooner if needed.  ThanksLUANN

## 2020-10-02 ENCOUNTER — TELEPHONE (OUTPATIENT)
Dept: FAMILY MEDICINE CLINIC | Facility: CLINIC | Age: 65
End: 2020-10-02

## 2020-10-02 NOTE — TELEPHONE ENCOUNTER
Called and spoke with patient about concern with use of Voltaren gel.  Had received paperwork about cross-reactivity with Voltaren gel and 1 of patient's allergies.  Patient does not have any problems with NSAIDs.  She does have a rash only reaction to sulfa antibiotics.  Discussed this with patient.  She is advised to use just a fingertip amount for the first couple of days to make sure that she tolerates.  If any rash develops, she should discontinue the gel and call our office.  Should be seen with more significant allergy symptoms in the emergency room right away.  Patient voiced understanding and agreement with this plan.  Thanks, Meli

## 2020-10-02 NOTE — TELEPHONE ENCOUNTER
Per Dr. Klein, Ms. Garcia has been called with recent lab results and recommendations  Continue all current medications and follow-up as planned or sooner if any problems.

## 2020-10-13 ENCOUNTER — PATIENT MESSAGE (OUTPATIENT)
Dept: FAMILY MEDICINE CLINIC | Facility: CLINIC | Age: 65
End: 2020-10-13

## 2020-11-11 ENCOUNTER — OFFICE VISIT (OUTPATIENT)
Dept: FAMILY MEDICINE CLINIC | Facility: CLINIC | Age: 65
End: 2020-11-11

## 2020-11-11 VITALS
WEIGHT: 198.7 LBS | SYSTOLIC BLOOD PRESSURE: 140 MMHG | DIASTOLIC BLOOD PRESSURE: 92 MMHG | HEART RATE: 59 BPM | OXYGEN SATURATION: 99 % | BODY MASS INDEX: 35.21 KG/M2 | HEIGHT: 63 IN

## 2020-11-11 DIAGNOSIS — N95.9 POSTMENOPAUSAL SYMPTOMS: ICD-10-CM

## 2020-11-11 DIAGNOSIS — I10 ESSENTIAL HYPERTENSION: Primary | ICD-10-CM

## 2020-11-11 DIAGNOSIS — G25.81 RESTLESS LEGS SYNDROME: ICD-10-CM

## 2020-11-11 PROCEDURE — 99214 OFFICE O/P EST MOD 30 MIN: CPT | Performed by: FAMILY MEDICINE

## 2020-11-11 RX ORDER — ESTRADIOL 1 MG/1
1.5 TABLET ORAL DAILY
Qty: 135 TABLET | Refills: 1 | Status: SHIPPED | OUTPATIENT
Start: 2020-11-11 | End: 2021-03-11 | Stop reason: SDUPTHER

## 2020-11-11 RX ORDER — ESTRADIOL 2 MG/1
TABLET ORAL
COMMUNITY
Start: 2020-09-03 | End: 2020-11-11

## 2020-11-11 NOTE — PROGRESS NOTES
"Follow Up Office Visit          Ysabel Garcia is a 65 y.o. female that presents today for   Chief Complaint   Patient presents with   • Hypertension       HPI            {Common H&P Review Areas:51774}    Review of Systems          Vitals:    11/11/20 1305   BP: 140/92   Pulse: 59   SpO2: 99%   Weight: 90.1 kg (198 lb 11.2 oz)   Height: 160 cm (63\")     Body mass index is 35.2 kg/m².    Physical Exam    Assessment/Plan   There are no diagnoses linked to this encounter.          This document has been electronically signed by Sharifa Klein MD on November 11, 2020 13:14 CST      "

## 2020-11-11 NOTE — PROGRESS NOTES
"Follow Up Office Visit          Ysabel Garcia is a 65 y.o. female that presents today for   Chief Complaint   Patient presents with   • Hypertension       HPI    Patient presents today for follow up hypertension.  Patient checks blood pressure at home regularly.  Notes that systolic is around 130-135.  Diastolic usually in the 80s.  She denies any headaches, blurry vision, chest pain or shortness of breath.  She takes atenolol 25 mg daily and HCTZ 25 mg daily.  No problems with these medications.     Patient takes estradiol to help with menopausal vasomotor symptoms.  She is currently taking 2 mg daily.  Rarely having any breakthrough symptoms on this medication.  She has tried to decrease to 1 mg daily previously, and starting having symptoms pretty severe again so went back to 2 mg daily.  Patient feels ok to try decreasing again, as she knows there are possible adverse effects.    Patient says that increasing requip helped a lot with her restless legs.  Takes 1/2 tablet (0.5 mg) at 5 pm and then 1 tab (1 mg) closer to bedtime.      The following portions of the patient's history were reviewed and updated as appropriate: allergies, current medications, past medical history and problem list.    Review of Systems   Constitutional: Negative for chills and fever.   HENT: Negative for congestion and sinus pain.    Respiratory: Negative for cough and shortness of breath.    Cardiovascular: Negative for chest pain and leg swelling.   Gastrointestinal: Negative for abdominal pain, diarrhea, nausea and vomiting.   Musculoskeletal: Negative for back pain, gait problem and joint swelling.   Skin: Negative for rash.   Neurological: Negative for weakness and headaches.   Psychiatric/Behavioral: Negative for dysphoric mood and sleep disturbance.           Vitals:    11/11/20 1305   BP: 140/92   Pulse: 59   SpO2: 99%   Weight: 90.1 kg (198 lb 11.2 oz)   Height: 160 cm (63\")     Body mass index is 35.2 kg/m².    Physical " Exam  Vitals signs reviewed.   Constitutional:       General: She is not in acute distress.     Appearance: She is well-developed.   HENT:      Head: Normocephalic and atraumatic.   Neck:      Musculoskeletal: Neck supple.   Cardiovascular:      Rate and Rhythm: Normal rate and regular rhythm.      Heart sounds: Normal heart sounds. No murmur.   Pulmonary:      Effort: Pulmonary effort is normal. No respiratory distress.      Breath sounds: Normal breath sounds. No wheezing or rales.   Abdominal:      Palpations: Abdomen is soft.      Tenderness: There is no abdominal tenderness.   Skin:     General: Skin is warm and dry.      Findings: No rash.   Neurological:      Mental Status: She is alert and oriented to person, place, and time.         Assessment/Plan   Diagnoses and all orders for this visit:    1. Essential hypertension (Primary)  -     Lipid Panel; Future  -     Comprehensive Metabolic Panel; Future    2. Postmenopausal symptoms  -     estradiol (ESTRACE) 1 MG tablet; Take 1.5 tablets by mouth Daily for 180 days.  Dispense: 135 tablet; Refill: 1    3. Restless legs syndrome      Patient presents for follow up  Doing well, reports blood pressure controlled at home  Continue current medications, and close monitoring as her BP is borderline today  Will check lipid panel and CMP  Patient will call with persistent BP > 140/90  Will plan to try and decrease estradiol to 1.5 mg daily  Advised lowest dose of hormone replacement for the shortest time possible  If symptoms worsen, can go back to 2 mg daily  She is in agreement with this plan  Restless legs syndrome controlled  Continue current dose of ropinirole.    Return in about 4 months (around 3/1/2021) for Recheck.         This document has been electronically signed by Sharifa Klein MD on November 11, 2020 13:10 CST

## 2021-01-20 RX ORDER — ATENOLOL 25 MG/1
25 TABLET ORAL DAILY
Qty: 30 TABLET | Refills: 0 | Status: SHIPPED | OUTPATIENT
Start: 2021-01-20 | End: 2021-01-22 | Stop reason: SDUPTHER

## 2021-01-22 ENCOUNTER — TELEPHONE (OUTPATIENT)
Dept: FAMILY MEDICINE CLINIC | Facility: CLINIC | Age: 66
End: 2021-01-22

## 2021-01-22 RX ORDER — ATENOLOL 25 MG/1
25 TABLET ORAL DAILY
Qty: 90 TABLET | Refills: 1 | Status: SHIPPED | OUTPATIENT
Start: 2021-01-22 | End: 2021-08-03

## 2021-01-22 NOTE — TELEPHONE ENCOUNTER
Prescription filled to [facility].  Thanks, LUANN Klein   Problem: Patient Care Overview  Goal: Plan of Care Review  Outcome: Ongoing (interventions implemented as appropriate)   06/03/18 0452   Coping/Psychosocial   Plan of Care Reviewed With son   Plan of Care Review   Progress declining   OTHER   Outcome Summary actively dying, son at bedside all night     Goal: Individualization and Mutuality  Outcome: Ongoing (interventions implemented as appropriate)    Goal: Discharge Needs Assessment  Outcome: Ongoing (interventions implemented as appropriate)    Goal: Interprofessional Rounds/Family Conf  Outcome: Ongoing (interventions implemented as appropriate)      Problem: Wound (Includes Pressure Injury) (Adult)  Goal: Signs and Symptoms of Listed Potential Problems Will be Absent, Minimized or Managed (Wound)  Outcome: Ongoing (interventions implemented as appropriate)      Problem: Palliative Care (Adult)  Goal: Maximized Comfort  Outcome: Ongoing (interventions implemented as appropriate)    Goal: Identify Related Risk Factors and Signs and Symptoms  Outcome: Ongoing (interventions implemented as appropriate)    Goal: Maximized Comfort  Outcome: Ongoing (interventions implemented as appropriate)    Goal: Enhanced Quality of Life  Outcome: Ongoing (interventions implemented as appropriate)      Problem: Fall Risk (Adult)  Goal: Absence of Fall  Outcome: Ongoing (interventions implemented as appropriate)      Problem: Dying Patient, Actively (Adult)  Goal: Comfort/Pain Control  Outcome: Ongoing (interventions implemented as appropriate)    Goal: Peace/Preservation of Dignity During the Dying Process  Outcome: Ongoing (interventions implemented as appropriate)

## 2021-01-22 NOTE — TELEPHONE ENCOUNTER
Ysabel came by bringing her ID and new insurance, she recently  and so I have updated her name in system.  She asked if Dr Klein would resend Atenolol to [facility] with her new name attached to it so they will fill.

## 2021-03-08 ENCOUNTER — LAB (OUTPATIENT)
Dept: LAB | Facility: HOSPITAL | Age: 66
End: 2021-03-08

## 2021-03-08 DIAGNOSIS — I10 ESSENTIAL HYPERTENSION: ICD-10-CM

## 2021-03-08 LAB
ALBUMIN SERPL-MCNC: 4 G/DL (ref 3.5–5.2)
ALBUMIN/GLOB SERPL: 1.3 G/DL
ALP SERPL-CCNC: 69 U/L (ref 39–117)
ALT SERPL W P-5'-P-CCNC: 19 U/L (ref 1–33)
ANION GAP SERPL CALCULATED.3IONS-SCNC: 12.2 MMOL/L (ref 5–15)
AST SERPL-CCNC: 22 U/L (ref 1–32)
BILIRUB SERPL-MCNC: <0.2 MG/DL (ref 0–1.2)
BUN SERPL-MCNC: 11 MG/DL (ref 8–23)
BUN/CREAT SERPL: 13.6 (ref 7–25)
CALCIUM SPEC-SCNC: 9.5 MG/DL (ref 8.6–10.5)
CHLORIDE SERPL-SCNC: 102 MMOL/L (ref 98–107)
CHOLEST SERPL-MCNC: 226 MG/DL (ref 0–200)
CO2 SERPL-SCNC: 23.8 MMOL/L (ref 22–29)
CREAT SERPL-MCNC: 0.81 MG/DL (ref 0.57–1)
GFR SERPL CREATININE-BSD FRML MDRD: 71 ML/MIN/1.73
GLOBULIN UR ELPH-MCNC: 3.1 GM/DL
GLUCOSE SERPL-MCNC: 84 MG/DL (ref 65–99)
HDLC SERPL-MCNC: 58 MG/DL (ref 40–60)
LDLC SERPL CALC-MCNC: 119 MG/DL (ref 0–100)
LDLC/HDLC SERPL: 1.92 {RATIO}
POTASSIUM SERPL-SCNC: 3.9 MMOL/L (ref 3.5–5.2)
PROT SERPL-MCNC: 7.1 G/DL (ref 6–8.5)
SODIUM SERPL-SCNC: 138 MMOL/L (ref 136–145)
TRIGL SERPL-MCNC: 282 MG/DL (ref 0–150)
VLDLC SERPL-MCNC: 49 MG/DL (ref 5–40)

## 2021-03-08 PROCEDURE — 36415 COLL VENOUS BLD VENIPUNCTURE: CPT

## 2021-03-08 PROCEDURE — 80053 COMPREHEN METABOLIC PANEL: CPT

## 2021-03-08 PROCEDURE — 80061 LIPID PANEL: CPT

## 2021-03-11 ENCOUNTER — OFFICE VISIT (OUTPATIENT)
Dept: FAMILY MEDICINE CLINIC | Facility: CLINIC | Age: 66
End: 2021-03-11

## 2021-03-11 VITALS
BODY MASS INDEX: 36.36 KG/M2 | HEIGHT: 63 IN | SYSTOLIC BLOOD PRESSURE: 144 MMHG | WEIGHT: 205.2 LBS | HEART RATE: 62 BPM | OXYGEN SATURATION: 99 % | DIASTOLIC BLOOD PRESSURE: 82 MMHG

## 2021-03-11 DIAGNOSIS — E78.2 MIXED HYPERLIPIDEMIA: ICD-10-CM

## 2021-03-11 DIAGNOSIS — L29.9 ITCHING: ICD-10-CM

## 2021-03-11 DIAGNOSIS — I10 ESSENTIAL HYPERTENSION: Primary | ICD-10-CM

## 2021-03-11 DIAGNOSIS — G25.81 RESTLESS LEGS SYNDROME: ICD-10-CM

## 2021-03-11 DIAGNOSIS — R63.5 WEIGHT GAIN: ICD-10-CM

## 2021-03-11 DIAGNOSIS — N95.9 POSTMENOPAUSAL SYMPTOMS: ICD-10-CM

## 2021-03-11 PROCEDURE — 99214 OFFICE O/P EST MOD 30 MIN: CPT | Performed by: FAMILY MEDICINE

## 2021-03-11 RX ORDER — CETIRIZINE HYDROCHLORIDE 10 MG/1
10 TABLET ORAL DAILY
Qty: 30 TABLET | Refills: 5
Start: 2021-03-11 | End: 2022-09-12

## 2021-03-11 RX ORDER — CHOLESTYRAMINE LIGHT 4 G/5.7G
4 POWDER, FOR SUSPENSION ORAL 2 TIMES DAILY
Qty: 180 PACKET | Refills: 1 | Status: CANCELLED | OUTPATIENT
Start: 2021-03-11

## 2021-03-11 RX ORDER — ESTRADIOL 1 MG/1
1 TABLET ORAL DAILY
Qty: 90 TABLET | Refills: 1 | Status: SHIPPED | OUTPATIENT
Start: 2021-03-11 | End: 2021-08-31

## 2021-03-11 RX ORDER — HYDROXYZINE HYDROCHLORIDE 25 MG/1
25 TABLET, FILM COATED ORAL NIGHTLY PRN
Qty: 30 TABLET | Refills: 2 | Status: CANCELLED | OUTPATIENT
Start: 2021-03-11

## 2021-03-11 RX ORDER — HYDROCHLOROTHIAZIDE 25 MG/1
25 TABLET ORAL DAILY
Qty: 90 TABLET | Refills: 1 | Status: SHIPPED | OUTPATIENT
Start: 2021-03-11 | End: 2021-08-31

## 2021-03-11 RX ORDER — CHOLECALCIFEROL (VITAMIN D3) 125 MCG
5 CAPSULE ORAL
COMMUNITY
End: 2021-09-10

## 2021-03-11 RX ORDER — ROPINIROLE 1 MG/1
1.5 TABLET, FILM COATED ORAL NIGHTLY
Qty: 45 TABLET | Refills: 2 | Status: SHIPPED | OUTPATIENT
Start: 2021-03-11 | End: 2021-05-28

## 2021-03-11 RX ORDER — UBIDECARENONE 100 MG
1000 CAPSULE ORAL DAILY
Qty: 90 CAPSULE | Refills: 3 | Status: SHIPPED | OUTPATIENT
Start: 2021-03-11

## 2021-03-11 NOTE — PROGRESS NOTES
"Chief Complaint  Hypertension (4 month recheck)    Subjective          Ysabel Llanos presents to [facility]  History of Present Illness    Patient presents today for follow-up hypertension.  She is doing okay since last office visit.  Blood pressure has been controlled at home.  She is currently taking hydrochlorothiazide 25 mg daily and atenolol 25 mg daily.  No problems with these medications.  Patient has been actively trying to lose weight, but is having trouble.  She notes that she has tried the keto diet and weight watchers in the past.  She gets frustrated by her inability to lose weight.     Objective   Vital Signs:   /82   Pulse 62   Ht 160 cm (63\")   Wt 93.1 kg (205 lb 3.2 oz)   SpO2 99%   BMI 36.35 kg/m²     Physical Exam  Vitals reviewed.   Constitutional:       General: She is not in acute distress.     Appearance: She is well-developed.   HENT:      Head: Normocephalic and atraumatic.   Cardiovascular:      Rate and Rhythm: Normal rate and regular rhythm.      Heart sounds: Normal heart sounds. No murmur heard.     Pulmonary:      Effort: Pulmonary effort is normal. No respiratory distress.      Breath sounds: Normal breath sounds. No wheezing or rales.   Abdominal:      Palpations: Abdomen is soft.      Tenderness: There is no abdominal tenderness.   Musculoskeletal:      Cervical back: Neck supple.   Skin:     General: Skin is warm and dry.      Findings: No rash.   Neurological:      Mental Status: She is alert and oriented to person, place, and time.        Result Review :   The following data was reviewed by: Sharifa Klein MD on 03/11/2021:  Common labs    Common Labsle 5/6/20 5/6/20 5/6/20 5/6/20 9/30/20 9/30/20 3/8/21 3/8/21    1126 1126 1126 1126 1145 1145 0950 0950   Glucose   93   82  84   BUN   11   13  11   Creatinine   0.86   0.80  0.81   eGFR Non  Am   67   72  71   Sodium   138   137  138   Potassium   4.2   4.3  3.9   Chloride   97 " (A)   99  102   Calcium   10.0   10.2  9.5   Albumin   4.40     4.00   Total Bilirubin   0.3     <0.2   Alkaline Phosphatase   71     69   AST (SGOT)   34 (A)     22   ALT (SGPT)   30     19   WBC 8.16    7.99      Hemoglobin 14.3    13.9      Hematocrit 40.2    40.5      Platelets 319    286      Total Cholesterol  254 (A)     226 (A)    Triglycerides  277 (A)     282 (A)    HDL Cholesterol  47     58    LDL Cholesterol   152 (A)     119 (A)    Hemoglobin A1C    5.44       (A) Abnormal value                      Assessment and Plan    Diagnoses and all orders for this visit:    1. Essential hypertension (Primary)  -     hydroCHLOROthiazide (HYDRODIURIL) 25 MG tablet; Take 1 tablet by mouth Daily for 180 days.  Dispense: 90 tablet; Refill: 1    2. Postmenopausal symptoms  -     estradiol (ESTRACE) 1 MG tablet; Take 1 tablet by mouth Daily for 180 days.  Dispense: 90 tablet; Refill: 1    3. Restless legs syndrome  -     rOPINIRole (Requip) 1 MG tablet; Take 1.5 tablets by mouth Every Night. Take 1 hour before bedtime.  Dispense: 45 tablet; Refill: 2    4. Weight gain  -     TSH; Future  -     T4, free; Future  -     T3, free; Future    5. Itching    6. Mixed hyperlipidemia    Other orders  -     cetirizine (zyrTEC) 10 MG tablet; Take 1 tablet by mouth Daily.  Dispense: 30 tablet; Refill: 5  -     Cancel: cholestyramine light 4 g packet; Take 1 packet by mouth 2 (Two) Times a Day.  Dispense: 180 packet; Refill: 1  -     D3-1000 25 MCG (1000 UT) capsule; Take 1 capsule by mouth Daily.  Dispense: 90 capsule; Refill: 3  -     Cancel: hydrOXYzine (ATARAX) 25 MG tablet; Take 1 tablet by mouth At Night As Needed for Itching.  Dispense: 30 tablet; Refill: 2      Patient presents today for follow-up.  Recent labs reviewed with patient.  Lipid panel has improved some since last check about 10 months ago.  Calculated 10-year ASCVD risk, which is intermediate at 9.5%.  Statin medication generally indicated for risk greater than  7.5%, and patient was informed of this.  Patient would like to try lifestyle modifications with diet and exercise for a little while longer before considering use of statin medication.  Discussed with patient other ways to lower her risk would be for continued blood pressure control.  She voiced understanding.  Restless leg symptoms improved with increased dose of medication.  Patient tolerating estradiol 1 mg daily for hormone replacement.  Will check labs for weight gain including TSH, free T4 and free T3.  Patient has been having some intermittent itching, so can use hydroxyzine as needed.        Follow Up   Return in about 6 months (around 9/11/2021).  Patient was given instructions and counseling regarding her condition or for health maintenance advice. Please see specific information pulled into the AVS if appropriate.         This document has been electronically signed by Sharifa Klein MD

## 2021-04-09 ENCOUNTER — PATIENT MESSAGE (OUTPATIENT)
Dept: FAMILY MEDICINE CLINIC | Facility: CLINIC | Age: 66
End: 2021-04-09

## 2021-05-28 DIAGNOSIS — G25.81 RESTLESS LEGS SYNDROME: ICD-10-CM

## 2021-05-28 RX ORDER — ROPINIROLE 1 MG/1
TABLET, FILM COATED ORAL
Qty: 45 TABLET | Refills: 11 | Status: SHIPPED | OUTPATIENT
Start: 2021-05-28 | End: 2022-03-01 | Stop reason: SDUPTHER

## 2021-07-06 ENCOUNTER — PATIENT MESSAGE (OUTPATIENT)
Dept: FAMILY MEDICINE CLINIC | Facility: CLINIC | Age: 66
End: 2021-07-06

## 2021-07-06 DIAGNOSIS — Z12.31 ENCOUNTER FOR SCREENING MAMMOGRAM FOR MALIGNANT NEOPLASM OF BREAST: Primary | ICD-10-CM

## 2021-07-19 ENCOUNTER — TELEPHONE (OUTPATIENT)
Dept: FAMILY MEDICINE CLINIC | Facility: CLINIC | Age: 66
End: 2021-07-19

## 2021-07-19 NOTE — TELEPHONE ENCOUNTER
Per Dr. Klein, Ms Llanos has been called with recent Bilateral Screening 3-D Mammorgam results & recommendations.  Continue current medications and follow-up as planned or sooner if any problems.

## 2021-07-19 NOTE — TELEPHONE ENCOUNTER
Please call and let patient know that mammogram is normal.  Plan to repeat in 1 year.  ThanksLUANN

## 2021-08-03 RX ORDER — ATENOLOL 25 MG/1
TABLET ORAL
Qty: 90 TABLET | Refills: 3 | Status: SHIPPED | OUTPATIENT
Start: 2021-08-03 | End: 2022-03-10 | Stop reason: SDUPTHER

## 2021-08-31 DIAGNOSIS — N95.9 POSTMENOPAUSAL SYMPTOMS: ICD-10-CM

## 2021-08-31 DIAGNOSIS — I10 ESSENTIAL HYPERTENSION: ICD-10-CM

## 2021-08-31 RX ORDER — HYDROCHLOROTHIAZIDE 25 MG/1
TABLET ORAL
Qty: 90 TABLET | Refills: 3 | Status: SHIPPED | OUTPATIENT
Start: 2021-08-31 | End: 2022-03-10 | Stop reason: SDUPTHER

## 2021-08-31 RX ORDER — ESTRADIOL 1 MG/1
TABLET ORAL
Qty: 90 TABLET | Refills: 3 | Status: SHIPPED | OUTPATIENT
Start: 2021-08-31 | End: 2022-03-10 | Stop reason: SDUPTHER

## 2021-09-10 ENCOUNTER — OFFICE VISIT (OUTPATIENT)
Dept: FAMILY MEDICINE CLINIC | Facility: CLINIC | Age: 66
End: 2021-09-10

## 2021-09-10 ENCOUNTER — LAB (OUTPATIENT)
Dept: LAB | Facility: HOSPITAL | Age: 66
End: 2021-09-10

## 2021-09-10 VITALS
SYSTOLIC BLOOD PRESSURE: 156 MMHG | BODY MASS INDEX: 35.97 KG/M2 | HEART RATE: 73 BPM | DIASTOLIC BLOOD PRESSURE: 90 MMHG | WEIGHT: 203 LBS | HEIGHT: 63 IN | OXYGEN SATURATION: 96 %

## 2021-09-10 DIAGNOSIS — R63.5 WEIGHT GAIN: ICD-10-CM

## 2021-09-10 DIAGNOSIS — G25.81 RESTLESS LEGS SYNDROME: ICD-10-CM

## 2021-09-10 DIAGNOSIS — G62.9 NEUROPATHY: ICD-10-CM

## 2021-09-10 DIAGNOSIS — I10 ESSENTIAL HYPERTENSION: ICD-10-CM

## 2021-09-10 DIAGNOSIS — G56.02 CARPAL TUNNEL SYNDROME OF LEFT WRIST: ICD-10-CM

## 2021-09-10 DIAGNOSIS — I10 ESSENTIAL HYPERTENSION: Primary | ICD-10-CM

## 2021-09-10 DIAGNOSIS — Z86.39 HISTORY OF HYPOTHYROIDISM: ICD-10-CM

## 2021-09-10 PROCEDURE — 84439 ASSAY OF FREE THYROXINE: CPT

## 2021-09-10 PROCEDURE — 80053 COMPREHEN METABOLIC PANEL: CPT

## 2021-09-10 PROCEDURE — 99214 OFFICE O/P EST MOD 30 MIN: CPT | Performed by: FAMILY MEDICINE

## 2021-09-10 PROCEDURE — 86376 MICROSOMAL ANTIBODY EACH: CPT

## 2021-09-10 PROCEDURE — 84481 FREE ASSAY (FT-3): CPT

## 2021-09-10 PROCEDURE — 84443 ASSAY THYROID STIM HORMONE: CPT

## 2021-09-10 PROCEDURE — 36415 COLL VENOUS BLD VENIPUNCTURE: CPT

## 2021-09-10 RX ORDER — GABAPENTIN 100 MG/1
100 CAPSULE ORAL NIGHTLY
Qty: 7 CAPSULE | Refills: 0 | Status: SHIPPED | OUTPATIENT
Start: 2021-09-10 | End: 2021-12-10

## 2021-09-10 RX ORDER — GABAPENTIN 300 MG/1
300 CAPSULE ORAL NIGHTLY
Qty: 30 CAPSULE | Refills: 0 | Status: SHIPPED | OUTPATIENT
Start: 2021-09-17 | End: 2021-10-25

## 2021-09-10 RX ORDER — MAGNESIUM 200 MG
200 TABLET ORAL 2 TIMES DAILY
COMMUNITY

## 2021-09-11 LAB
ALBUMIN SERPL-MCNC: 4.3 G/DL (ref 3.5–5.2)
ALBUMIN/GLOB SERPL: 1.5 G/DL
ALP SERPL-CCNC: 74 U/L (ref 39–117)
ALT SERPL W P-5'-P-CCNC: 26 U/L (ref 1–33)
ANION GAP SERPL CALCULATED.3IONS-SCNC: 12.2 MMOL/L (ref 5–15)
AST SERPL-CCNC: 29 U/L (ref 1–32)
BILIRUB SERPL-MCNC: 0.2 MG/DL (ref 0–1.2)
BUN SERPL-MCNC: 15 MG/DL (ref 8–23)
BUN/CREAT SERPL: 18.1 (ref 7–25)
CALCIUM SPEC-SCNC: 10.1 MG/DL (ref 8.6–10.5)
CHLORIDE SERPL-SCNC: 101 MMOL/L (ref 98–107)
CO2 SERPL-SCNC: 26.8 MMOL/L (ref 22–29)
CREAT SERPL-MCNC: 0.83 MG/DL (ref 0.57–1)
GFR SERPL CREATININE-BSD FRML MDRD: 69 ML/MIN/1.73
GLOBULIN UR ELPH-MCNC: 2.9 GM/DL
GLUCOSE SERPL-MCNC: 114 MG/DL (ref 65–99)
POTASSIUM SERPL-SCNC: 4.1 MMOL/L (ref 3.5–5.2)
PROT SERPL-MCNC: 7.2 G/DL (ref 6–8.5)
SODIUM SERPL-SCNC: 140 MMOL/L (ref 136–145)
THYROPEROXIDASE AB SERPL-ACNC: <8 IU/ML (ref 0–34)

## 2021-09-12 ENCOUNTER — TELEPHONE (OUTPATIENT)
Dept: FAMILY MEDICINE CLINIC | Facility: CLINIC | Age: 66
End: 2021-09-12

## 2021-09-12 DIAGNOSIS — G62.9 NEUROPATHY: ICD-10-CM

## 2021-09-12 DIAGNOSIS — Z86.39 HISTORY OF HYPOTHYROIDISM: Primary | ICD-10-CM

## 2021-09-12 LAB
T3FREE SERPL-MCNC: 3.27 PG/ML (ref 2–4.4)
T4 FREE SERPL-MCNC: 1.09 NG/DL (ref 0.93–1.7)
TSH SERPL DL<=0.05 MIU/L-ACNC: 2.15 UIU/ML (ref 0.27–4.2)

## 2021-09-12 NOTE — TELEPHONE ENCOUNTER
Please let patient know that CMP ok, slightly elevated glucose.  Will continue to monitor.  TPO antibodies along with all other thyroid tests were normal.  Kiet Klein

## 2021-10-24 DIAGNOSIS — G62.9 NEUROPATHY: ICD-10-CM

## 2021-10-25 RX ORDER — GABAPENTIN 300 MG/1
CAPSULE ORAL
Qty: 30 CAPSULE | Refills: 0 | Status: SHIPPED | OUTPATIENT
Start: 2021-10-25 | End: 2021-11-22

## 2021-11-03 ENCOUNTER — TELEPHONE (OUTPATIENT)
Dept: FAMILY MEDICINE CLINIC | Facility: CLINIC | Age: 66
End: 2021-11-03

## 2021-11-03 NOTE — TELEPHONE ENCOUNTER
Please let patient know that we received report from neurology testing that showed carpal tunnel syndrome, mild.  Would recommend consideration of orthopedic referral to discuss injections if wrist splints at night are not helping.  Please find out if this is something patient desires, or if she would prefer to discuss further at next office visit.  Thanks, LUANN Klein

## 2021-11-03 NOTE — TELEPHONE ENCOUNTER
Per Dr. Klein, Ms. Llanos has been called with recent Neurology Testing results & recommendations.  Continue current medications and follow-up as planned or sooner if any problems.     She states she will try the wrist splint, she said she would pick on up at [facility].  She said she would discuss the referral with you at her appointment in December if the splint does not help

## 2021-11-19 DIAGNOSIS — G62.9 NEUROPATHY: ICD-10-CM

## 2021-11-20 NOTE — TELEPHONE ENCOUNTER
Per Dr. Klein, Ms Llanos has been called with recent lab results and recommendations.   Continue current medications and follow-up as planned or sooner if any problems       no

## 2021-11-22 RX ORDER — GABAPENTIN 300 MG/1
CAPSULE ORAL
Qty: 30 CAPSULE | Refills: 0 | Status: SHIPPED | OUTPATIENT
Start: 2021-11-22 | End: 2021-12-19

## 2021-12-10 ENCOUNTER — OFFICE VISIT (OUTPATIENT)
Dept: FAMILY MEDICINE CLINIC | Facility: CLINIC | Age: 66
End: 2021-12-10

## 2021-12-10 VITALS
WEIGHT: 205.38 LBS | BODY MASS INDEX: 36.39 KG/M2 | HEIGHT: 63 IN | HEART RATE: 77 BPM | SYSTOLIC BLOOD PRESSURE: 142 MMHG | DIASTOLIC BLOOD PRESSURE: 90 MMHG | OXYGEN SATURATION: 99 % | TEMPERATURE: 98.2 F

## 2021-12-10 DIAGNOSIS — I10 ESSENTIAL HYPERTENSION: Primary | ICD-10-CM

## 2021-12-10 DIAGNOSIS — Z23 NEED FOR INFLUENZA VACCINATION: ICD-10-CM

## 2021-12-10 DIAGNOSIS — G25.81 RESTLESS LEGS SYNDROME: ICD-10-CM

## 2021-12-10 DIAGNOSIS — G62.9 NEUROPATHY: ICD-10-CM

## 2021-12-10 PROCEDURE — 90686 IIV4 VACC NO PRSV 0.5 ML IM: CPT | Performed by: FAMILY MEDICINE

## 2021-12-10 PROCEDURE — 90471 IMMUNIZATION ADMIN: CPT | Performed by: FAMILY MEDICINE

## 2021-12-10 PROCEDURE — 99213 OFFICE O/P EST LOW 20 MIN: CPT | Performed by: FAMILY MEDICINE

## 2021-12-10 NOTE — PROGRESS NOTES
"Chief Complaint  Hypertension    Subjective          Ysabel Llanos presents to [facility] [place]  History of Present Illness    Answers for HPI/ROS submitted by the patient on 12/10/2021  Please describe your symptoms.: Burning feet and discuss blood workup  Have you had these symptoms before?: Yes  How long have you been having these symptoms?: Greater than 2 weeks  Please list any medications you are currently taking for this condition.: Gabapentin - 300mg  Please describe any probable cause for these symptoms. : Nerve problems?  What is the primary reason for your visit?: Other    Patient presents today for follow-up.  Notes that overall she has been doing okay.  Blood pressure has been normal.  Patient is taking hydrochlorothiazide 25 mg daily and atenolol 25 mg daily.  Neuropathy/burning in her feet has persisted.  She is currently taking gabapentin 300 mg at night, which helps some.  Also using Requip to help with restless legs.  Patient notices some symptoms throughout the day, but worse at night.  Patient needing to work with insurance about getting EMG/nerve conduction testing completed of the lower extremities.    Objective   Vital Signs:   /90   Pulse 77   Temp 98.2 °F (36.8 °C)   Ht 160 cm (63\")   Wt 93.2 kg (205 lb 6 oz)   SpO2 99%   BMI 36.38 kg/m²     Physical Exam  Vitals reviewed.   Constitutional:       General: She is not in acute distress.     Appearance: She is well-developed.   Cardiovascular:      Rate and Rhythm: Normal rate and regular rhythm.      Heart sounds: Normal heart sounds. No murmur heard.      Pulmonary:      Effort: Pulmonary effort is normal. No respiratory distress.      Breath sounds: Normal breath sounds. No wheezing or rales.   Abdominal:      Palpations: Abdomen is soft.      Tenderness: There is no abdominal tenderness.   Skin:     General: Skin is warm and dry.   Neurological:      Mental Status: She is alert " and oriented to person, place, and time.        Result Review :   The following data was reviewed by: Sharifa Klein MD on 12/10/2021:  Common labs    Common Labsle 3/8/21 3/8/21 9/10/21    0950 0950    Glucose  84 114 (A)   BUN  11 15   Creatinine  0.81 0.83   eGFR Non African Am  71 69   Sodium  138 140   Potassium  3.9 4.1   Chloride  102 101   Calcium  9.5 10.1   Albumin  4.00 4.30   Total Bilirubin  <0.2 0.2   Alkaline Phosphatase  69 74   AST (SGOT)  22 29   ALT (SGPT)  19 26   Total Cholesterol 226 (A)     Triglycerides 282 (A)     HDL Cholesterol 58     LDL Cholesterol  119 (A)     (A) Abnormal value            Lab Results   Component Value Date    TSH 2.150 09/10/2021                 Assessment and Plan    Diagnoses and all orders for this visit:    1. Essential hypertension (Primary)    2. Neuropathy  -     gabapentin (NEURONTIN) 400 MG capsule; Take 1 capsule by mouth every night at bedtime.  Dispense: 30 capsule; Refill: 0    3. Restless legs syndrome    4. Need for influenza vaccination  -     FluLaval/Fluarix/Fluzone >6 Months (3213-0776)      Patient seen today for follow-up.  Blood pressure borderline.  Patient will continue to monitor at home.  Will call with any persistence of elevations greater than 140/90.  Continue with current antihypertensive medications at this time.  Neuropathy symptoms seem to be worse.  Gabapentin helps some.  Will increase gabapentin to 400 mg at night.  Continue Requip for restless legs.  Patient would benefit from lower extremity EMG/nerve conduction, and will check with insurance/neurology office to get this done.  Due for flu vaccine today, given.        Follow Up   Return in about 3 months (around 3/10/2022).  Patient was given instructions and counseling regarding her condition or for health maintenance advice. Please see specific information pulled into the AVS if appropriate.           This document has been electronically signed by Sharifa Klein MD

## 2021-12-19 RX ORDER — GABAPENTIN 400 MG/1
400 CAPSULE ORAL
Qty: 30 CAPSULE | Refills: 0 | Status: SHIPPED | OUTPATIENT
Start: 2021-12-19 | End: 2022-01-26 | Stop reason: SDUPTHER

## 2022-02-17 ENCOUNTER — TELEPHONE (OUTPATIENT)
Dept: FAMILY MEDICINE CLINIC | Facility: CLINIC | Age: 67
End: 2022-02-17

## 2022-02-17 NOTE — TELEPHONE ENCOUNTER
Please let patient know that neurological test (EMG/Nerve Conduction) of lower extremities was normal.  We can discuss further at upcoming office visit.  ThanksLUANN

## 2022-03-01 DIAGNOSIS — G25.81 RESTLESS LEGS SYNDROME: ICD-10-CM

## 2022-03-01 RX ORDER — ROPINIROLE 1 MG/1
1 TABLET, FILM COATED ORAL NIGHTLY
Qty: 45 TABLET | Refills: 11 | Status: SHIPPED | OUTPATIENT
Start: 2022-03-01 | End: 2022-03-02 | Stop reason: SDUPTHER

## 2022-03-02 ENCOUNTER — TELEPHONE (OUTPATIENT)
Dept: FAMILY MEDICINE CLINIC | Facility: CLINIC | Age: 67
End: 2022-03-02

## 2022-03-02 DIAGNOSIS — G62.9 NEUROPATHY: ICD-10-CM

## 2022-03-02 DIAGNOSIS — G25.81 RESTLESS LEGS SYNDROME: ICD-10-CM

## 2022-03-02 RX ORDER — GABAPENTIN 400 MG/1
400 CAPSULE ORAL
Qty: 30 CAPSULE | Refills: 0 | Status: SHIPPED | OUTPATIENT
Start: 2022-03-02 | End: 2022-03-10 | Stop reason: SDUPTHER

## 2022-03-02 RX ORDER — ROPINIROLE 1 MG/1
1 TABLET, FILM COATED ORAL NIGHTLY
Qty: 90 TABLET | Refills: 3 | Status: SHIPPED | OUTPATIENT
Start: 2022-03-02 | End: 2022-03-10 | Stop reason: SDUPTHER

## 2022-03-02 NOTE — TELEPHONE ENCOUNTER
Gabapentin  Last Script  01/26/2022  #90, NR    Next Archan[facility] 03/10/2022    Last OV 12/10/2021    Refill for Requip has already been sent to [facility]    ----- Message from Ysabel Llanos sent at 3/2/2022  9:28 AM CST -----  Regarding: Refill on Ropineral & Gabapentin   [facility] that [facility] order [facility] sent to them. I don’t have the use of this pharmacy at this time. Please submit the order to [facility]. Thank you

## 2022-03-02 NOTE — TELEPHONE ENCOUNTER
Gabapentin Refill  Last Script  01/26/2022  #30, NR    Next Appt:   03/10/2022 - Sharifa Klein MD    Last OV 12/10/2021    Refill on Requip has already been sent to [facility]      ----- Message from Ysabel Llanos sent at 3/2/2022  9:28 AM CST -----  Regarding: Refill on Ropineral & Gabapentin   [facility] that an order was sent to them. I don’t have the use of this pharmacy at this time. Please submit the order to [facility]. Thank you

## 2022-03-02 NOTE — TELEPHONE ENCOUNTER
Requested Refills Sent to [facility].    ----- Message from Ysabel Llanos sent at 3/2/2022  9:28 AM CST -----  Regarding: Refill on Ropineral & Gabapentin   I [facility] that an order was sent to them. I don’t have the use of this pharmacy at this time. Please submit the order to [facility]. Thank you

## 2022-03-10 ENCOUNTER — OFFICE VISIT (OUTPATIENT)
Dept: FAMILY MEDICINE CLINIC | Facility: CLINIC | Age: 67
End: 2022-03-10

## 2022-03-10 VITALS
HEIGHT: 63 IN | SYSTOLIC BLOOD PRESSURE: 142 MMHG | WEIGHT: 209.9 LBS | OXYGEN SATURATION: 96 % | HEART RATE: 70 BPM | BODY MASS INDEX: 37.19 KG/M2 | DIASTOLIC BLOOD PRESSURE: 90 MMHG

## 2022-03-10 DIAGNOSIS — N95.9 POSTMENOPAUSAL SYMPTOMS: ICD-10-CM

## 2022-03-10 DIAGNOSIS — E55.9 VITAMIN D DEFICIENCY: ICD-10-CM

## 2022-03-10 DIAGNOSIS — R79.89 ABNORMAL THYROID BLOOD TEST: ICD-10-CM

## 2022-03-10 DIAGNOSIS — Z86.39 HISTORY OF HYPOTHYROIDISM: ICD-10-CM

## 2022-03-10 DIAGNOSIS — R73.09 ELEVATED GLUCOSE: ICD-10-CM

## 2022-03-10 DIAGNOSIS — I10 ESSENTIAL HYPERTENSION: Primary | ICD-10-CM

## 2022-03-10 DIAGNOSIS — E78.2 MIXED HYPERLIPIDEMIA: ICD-10-CM

## 2022-03-10 DIAGNOSIS — G62.9 NEUROPATHY: ICD-10-CM

## 2022-03-10 DIAGNOSIS — G25.81 RESTLESS LEGS SYNDROME: ICD-10-CM

## 2022-03-10 PROCEDURE — 99214 OFFICE O/P EST MOD 30 MIN: CPT | Performed by: FAMILY MEDICINE

## 2022-03-10 RX ORDER — GABAPENTIN 400 MG/1
400 CAPSULE ORAL
Qty: 30 CAPSULE | Refills: 2 | Status: SHIPPED | OUTPATIENT
Start: 2022-04-01 | End: 2022-06-10

## 2022-03-10 RX ORDER — ATENOLOL 25 MG/1
25 TABLET ORAL DAILY
Qty: 90 TABLET | Refills: 3 | Status: SHIPPED | OUTPATIENT
Start: 2022-03-10 | End: 2022-06-21 | Stop reason: SDUPTHER

## 2022-03-10 RX ORDER — ROPINIROLE 1 MG/1
2 TABLET, FILM COATED ORAL NIGHTLY
Qty: 180 TABLET | Refills: 3 | Status: SHIPPED | OUTPATIENT
Start: 2022-03-10 | End: 2022-06-10 | Stop reason: SDUPTHER

## 2022-03-10 RX ORDER — ESTRADIOL 1 MG/1
1 TABLET ORAL DAILY
Qty: 90 TABLET | Refills: 3 | Status: SHIPPED | OUTPATIENT
Start: 2022-03-10 | End: 2022-06-21 | Stop reason: SDUPTHER

## 2022-03-10 RX ORDER — HYDROCHLOROTHIAZIDE 25 MG/1
25 TABLET ORAL DAILY
Qty: 90 TABLET | Refills: 3 | Status: SHIPPED | OUTPATIENT
Start: 2022-03-10 | End: 2022-06-21 | Stop reason: SDUPTHER

## 2022-03-10 NOTE — PROGRESS NOTES
"Chief Complaint  Hypertension (3 mo f/u )    Subjective          Ysabel Llanos presents to [facility] [place]  History of Present Illness    Answers for HPI/ROS submitted by the patient on 3/3/2022  Please describe your symptoms.: Follow up and not sleeping well  Have you had these symptoms before?: Yes  How long have you been having these symptoms?: Greater than 2 weeks  Please list any medications you are currently taking for this condition.: None  Please describe any probable cause for these symptoms. : Unknown  What is the primary reason for your visit?: Other    Patient seen today for follow-up.  Current medical problems include hypertension, restless leg syndrome, hypothyroidism, hyperlipidemia, neuropathy and vitamin D deficiency.  Chronic medical problems have been controlled.  Patient has not been sleeping well.    Objective   Vital Signs:   /90   Pulse 70   Ht 160 cm (63\")   Wt 95.2 kg (209 lb 14.4 oz)   SpO2 96%   BMI 37.18 kg/m²     Physical Exam  Vitals reviewed.   Constitutional:       General: She is not in acute distress.     Appearance: She is well-developed.   Cardiovascular:      Rate and Rhythm: Normal rate and regular rhythm.      Heart sounds: Normal heart sounds. No murmur heard.  Pulmonary:      Effort: Pulmonary effort is normal. No respiratory distress.      Breath sounds: Normal breath sounds. No wheezing or rales.   Skin:     General: Skin is warm and dry.   Neurological:      Mental Status: She is alert and oriented to person, place, and time.        Result Review :   The following data was reviewed by: Sharifa Klein MD on 03/10/2022:  Common labs    Common Labsle 9/10/21   Glucose 114 (A)   BUN 15   Creatinine 0.83   eGFR Non African Am 69   Sodium 140   Potassium 4.1   Chloride 101   Calcium 10.1   Albumin 4.30   Total Bilirubin 0.2   Alkaline Phosphatase 74   AST (SGOT) 29   ALT (SGPT) 26   (A) Abnormal value        "               Assessment and Plan    Diagnoses and all orders for this visit:    1. Essential hypertension (Primary)  -     atenolol (TENORMIN) 25 MG tablet; Take 1 tablet by mouth Daily.  Dispense: 90 tablet; Refill: 3  -     hydroCHLOROthiazide (HYDRODIURIL) 25 MG tablet; Take 1 tablet by mouth Daily.  Dispense: 90 tablet; Refill: 3  -     CBC & Differential; Future  -     Comprehensive Metabolic Panel; Future  -     Hemoglobin A1c; Future  -     TSH; Future    2. Mixed hyperlipidemia  -     Lipid Panel; Future    3. History of hypothyroidism  -     TSH; Future  -     T4, free; Future    4. Abnormal thyroid blood test  -     TSH; Future  -     T4, free; Future    5. Restless legs syndrome  -     rOPINIRole (REQUIP) 1 MG tablet; Take 2 tablets by mouth Every Night. Take 1 hour before bedtime.  Dispense: 180 tablet; Refill: 3    6. Neuropathy  -     gabapentin (NEURONTIN) 400 MG capsule; Take 1 capsule by mouth every night at bedtime.  Dispense: 30 capsule; Refill: 2    7. Postmenopausal symptoms  -     estradiol (ESTRACE) 1 MG tablet; Take 1 tablet by mouth Daily.  Dispense: 90 tablet; Refill: 3    8. Vitamin D deficiency  -     Vitamin D 25 Hydroxy; Future    9. Elevated glucose  -     Hemoglobin A1c; Future      Patient seen today for follow-up  Hypertension borderline, will continue to monitor  Continue current medication  Check labs as above prior to next office visit  Patient has history of hypothyroidism, repeat thyroid studies  Continue hormone replacement therapy for postmenopausal symptoms  Patient understands risk of medication  Continue gabapentin for neuropathy, patient has referral to neurology for EMG/nerve conduction testing of the lower extremities  Elevated glucose on last check, will check A1c to make sure no prediabetes/diabetes  Discussed good sleep hygiene habits      Follow Up   Return in about 3 months (around 6/10/2022) for Recheck.  Patient was given instructions and counseling regarding  her condition or for health maintenance advice. Please see specific information pulled into the AVS if appropriate.           This document has been electronically signed by Sharifa Klein MD

## 2022-06-06 ENCOUNTER — LAB (OUTPATIENT)
Dept: LAB | Facility: HOSPITAL | Age: 67
End: 2022-06-06

## 2022-06-06 DIAGNOSIS — Z86.39 HISTORY OF HYPOTHYROIDISM: ICD-10-CM

## 2022-06-06 DIAGNOSIS — E78.2 MIXED HYPERLIPIDEMIA: ICD-10-CM

## 2022-06-06 DIAGNOSIS — E55.9 VITAMIN D DEFICIENCY: ICD-10-CM

## 2022-06-06 DIAGNOSIS — R79.89 ABNORMAL THYROID BLOOD TEST: ICD-10-CM

## 2022-06-06 DIAGNOSIS — I10 ESSENTIAL HYPERTENSION: ICD-10-CM

## 2022-06-06 DIAGNOSIS — R73.09 ELEVATED GLUCOSE: ICD-10-CM

## 2022-06-06 LAB
25(OH)D3 SERPL-MCNC: 65 NG/ML (ref 30–100)
ALBUMIN SERPL-MCNC: 4.5 G/DL (ref 3.5–5.2)
ALBUMIN/GLOB SERPL: 1.6 G/DL
ALP SERPL-CCNC: 69 U/L (ref 39–117)
ALT SERPL W P-5'-P-CCNC: 34 U/L (ref 1–33)
ANION GAP SERPL CALCULATED.3IONS-SCNC: 12.5 MMOL/L (ref 5–15)
AST SERPL-CCNC: 34 U/L (ref 1–32)
BILIRUB SERPL-MCNC: 0.3 MG/DL (ref 0–1.2)
BUN SERPL-MCNC: 11 MG/DL (ref 8–23)
BUN/CREAT SERPL: 15.5 (ref 7–25)
CALCIUM SPEC-SCNC: 10 MG/DL (ref 8.6–10.5)
CHLORIDE SERPL-SCNC: 100 MMOL/L (ref 98–107)
CHOLEST SERPL-MCNC: 188 MG/DL (ref 0–200)
CO2 SERPL-SCNC: 25.5 MMOL/L (ref 22–29)
CREAT SERPL-MCNC: 0.71 MG/DL (ref 0.57–1)
EGFRCR SERPLBLD CKD-EPI 2021: 94.5 ML/MIN/1.73
GLOBULIN UR ELPH-MCNC: 2.8 GM/DL
GLUCOSE SERPL-MCNC: 98 MG/DL (ref 65–99)
HDLC SERPL-MCNC: 53 MG/DL (ref 40–60)
LDLC SERPL CALC-MCNC: 107 MG/DL (ref 0–100)
LDLC/HDLC SERPL: 1.94 {RATIO}
POTASSIUM SERPL-SCNC: 3.7 MMOL/L (ref 3.5–5.2)
PROT SERPL-MCNC: 7.3 G/DL (ref 6–8.5)
SODIUM SERPL-SCNC: 138 MMOL/L (ref 136–145)
T4 FREE SERPL-MCNC: 1.13 NG/DL (ref 0.93–1.7)
TRIGL SERPL-MCNC: 160 MG/DL (ref 0–150)
TSH SERPL DL<=0.05 MIU/L-ACNC: 2.52 UIU/ML (ref 0.27–4.2)
VLDLC SERPL-MCNC: 28 MG/DL (ref 5–40)

## 2022-06-06 PROCEDURE — 80053 COMPREHEN METABOLIC PANEL: CPT

## 2022-06-06 PROCEDURE — 80061 LIPID PANEL: CPT

## 2022-06-06 PROCEDURE — 84443 ASSAY THYROID STIM HORMONE: CPT

## 2022-06-06 PROCEDURE — 82306 VITAMIN D 25 HYDROXY: CPT

## 2022-06-06 PROCEDURE — 84439 ASSAY OF FREE THYROXINE: CPT

## 2022-06-06 PROCEDURE — 83036 HEMOGLOBIN GLYCOSYLATED A1C: CPT

## 2022-06-06 PROCEDURE — 36415 COLL VENOUS BLD VENIPUNCTURE: CPT

## 2022-06-06 PROCEDURE — 85025 COMPLETE CBC W/AUTO DIFF WBC: CPT

## 2022-06-07 LAB
BASOPHILS # BLD AUTO: 0.05 10*3/MM3 (ref 0–0.2)
BASOPHILS NFR BLD AUTO: 0.8 % (ref 0–1.5)
DEPRECATED RDW RBC AUTO: 43.4 FL (ref 37–54)
EOSINOPHIL # BLD AUTO: 0.05 10*3/MM3 (ref 0–0.4)
EOSINOPHIL NFR BLD AUTO: 0.8 % (ref 0.3–6.2)
ERYTHROCYTE [DISTWIDTH] IN BLOOD BY AUTOMATED COUNT: 13.4 % (ref 12.3–15.4)
HBA1C MFR BLD: 5.2 % (ref 4.8–5.6)
HCT VFR BLD AUTO: 41.9 % (ref 34–46.6)
HGB BLD-MCNC: 14.1 G/DL (ref 12–15.9)
IMM GRANULOCYTES # BLD AUTO: 0.02 10*3/MM3 (ref 0–0.05)
IMM GRANULOCYTES NFR BLD AUTO: 0.3 % (ref 0–0.5)
LYMPHOCYTES # BLD AUTO: 1.82 10*3/MM3 (ref 0.7–3.1)
LYMPHOCYTES NFR BLD AUTO: 29.6 % (ref 19.6–45.3)
MCH RBC QN AUTO: 29.8 PG (ref 26.6–33)
MCHC RBC AUTO-ENTMCNC: 33.7 G/DL (ref 31.5–35.7)
MCV RBC AUTO: 88.6 FL (ref 79–97)
MONOCYTES # BLD AUTO: 0.33 10*3/MM3 (ref 0.1–0.9)
MONOCYTES NFR BLD AUTO: 5.4 % (ref 5–12)
NEUTROPHILS NFR BLD AUTO: 3.87 10*3/MM3 (ref 1.7–7)
NEUTROPHILS NFR BLD AUTO: 63.1 % (ref 42.7–76)
NRBC BLD AUTO-RTO: 0 /100 WBC (ref 0–0.2)
PLATELET # BLD AUTO: 267 10*3/MM3 (ref 140–450)
PMV BLD AUTO: 11.5 FL (ref 6–12)
RBC # BLD AUTO: 4.73 10*6/MM3 (ref 3.77–5.28)
WBC NRBC COR # BLD: 6.14 10*3/MM3 (ref 3.4–10.8)

## 2022-06-10 ENCOUNTER — OFFICE VISIT (OUTPATIENT)
Dept: FAMILY MEDICINE CLINIC | Facility: CLINIC | Age: 67
End: 2022-06-10

## 2022-06-10 VITALS
SYSTOLIC BLOOD PRESSURE: 140 MMHG | HEART RATE: 64 BPM | DIASTOLIC BLOOD PRESSURE: 89 MMHG | HEIGHT: 63 IN | BODY MASS INDEX: 36.14 KG/M2 | WEIGHT: 204 LBS | OXYGEN SATURATION: 94 %

## 2022-06-10 DIAGNOSIS — I10 ESSENTIAL HYPERTENSION: Primary | ICD-10-CM

## 2022-06-10 DIAGNOSIS — G25.81 RESTLESS LEGS SYNDROME: ICD-10-CM

## 2022-06-10 DIAGNOSIS — G62.9 NEUROPATHY: ICD-10-CM

## 2022-06-10 DIAGNOSIS — M25.532 LEFT WRIST PAIN: ICD-10-CM

## 2022-06-10 PROCEDURE — 99214 OFFICE O/P EST MOD 30 MIN: CPT | Performed by: FAMILY MEDICINE

## 2022-06-10 RX ORDER — ROPINIROLE 1 MG/1
2.5 TABLET, FILM COATED ORAL NIGHTLY
Qty: 225 TABLET | Refills: 1 | Status: SHIPPED | OUTPATIENT
Start: 2022-06-10 | End: 2022-10-31

## 2022-06-10 RX ORDER — GABAPENTIN 600 MG/1
600 TABLET ORAL
Qty: 90 TABLET | Refills: 0 | Status: SHIPPED | OUTPATIENT
Start: 2022-06-10 | End: 2022-08-29

## 2022-06-16 ENCOUNTER — TELEPHONE (OUTPATIENT)
Dept: FAMILY MEDICINE CLINIC | Facility: CLINIC | Age: 67
End: 2022-06-16

## 2022-06-16 NOTE — TELEPHONE ENCOUNTER
Per Dr. Klein, Ms. Llanos has been called with recent Left Wrist x-ray results & recommendations.  Continue current medications and follow-up as planned or sooner if any problems.       ----- Message from Sharifa Klein MD sent at 6/15/2022 11:17 PM CDT -----  Wrist is normal without significant arthritis.  - LUANN Klein

## 2022-06-21 ENCOUNTER — TELEPHONE (OUTPATIENT)
Dept: FAMILY MEDICINE CLINIC | Facility: CLINIC | Age: 67
End: 2022-06-21

## 2022-06-21 DIAGNOSIS — I10 ESSENTIAL HYPERTENSION: ICD-10-CM

## 2022-06-21 DIAGNOSIS — N95.9 POSTMENOPAUSAL SYMPTOMS: ICD-10-CM

## 2022-06-21 RX ORDER — HYDROCHLOROTHIAZIDE 25 MG/1
25 TABLET ORAL DAILY
Qty: 90 TABLET | Refills: 3 | Status: SHIPPED | OUTPATIENT
Start: 2022-06-21

## 2022-06-21 RX ORDER — ESTRADIOL 1 MG/1
1 TABLET ORAL DAILY
Qty: 90 TABLET | Refills: 3 | Status: SHIPPED | OUTPATIENT
Start: 2022-06-21

## 2022-06-21 RX ORDER — ATENOLOL 25 MG/1
25 TABLET ORAL DAILY
Qty: 90 TABLET | Refills: 3 | Status: SHIPPED | OUTPATIENT
Start: 2022-06-21

## 2022-06-21 NOTE — TELEPHONE ENCOUNTER
Requested Refills Sent  Next Ar[facility] 09/12/2022    ----- Message from Ysabel Llanos sent at 6/21/2022  8:14 AM CDT -----  Regarding: Prescription refill  Hi Dr Klein,     Please send the following prescriptions to my mail order drug company.        Atenolol     Hydrochlorothiazide     Estradiol     Thank you,    Ysabel Llanos

## 2022-08-28 DIAGNOSIS — G62.9 NEUROPATHY: ICD-10-CM

## 2022-08-29 RX ORDER — GABAPENTIN 600 MG/1
TABLET ORAL
Qty: 90 TABLET | Refills: 2 | Status: SHIPPED | OUTPATIENT
Start: 2022-08-29 | End: 2023-03-02

## 2022-08-31 ENCOUNTER — LAB (OUTPATIENT)
Dept: LAB | Facility: HOSPITAL | Age: 67
End: 2022-08-31

## 2022-08-31 DIAGNOSIS — G62.9 NEUROPATHY: ICD-10-CM

## 2022-08-31 LAB
MAGNESIUM SERPL-MCNC: 2 MG/DL (ref 1.6–2.4)
VIT B12 BLD-MCNC: 396 PG/ML (ref 211–946)

## 2022-08-31 PROCEDURE — 84207 ASSAY OF VITAMIN B-6: CPT

## 2022-08-31 PROCEDURE — 36415 COLL VENOUS BLD VENIPUNCTURE: CPT

## 2022-08-31 PROCEDURE — 82607 VITAMIN B-12: CPT

## 2022-08-31 PROCEDURE — 83735 ASSAY OF MAGNESIUM: CPT

## 2022-08-31 RX ORDER — LANOLIN ALCOHOL/MO/W.PET/CERES
1000 CREAM (GRAM) TOPICAL DAILY
Qty: 30 TABLET | Refills: 5 | Status: SHIPPED | OUTPATIENT
Start: 2022-08-31 | End: 2023-01-13 | Stop reason: SDUPTHER

## 2022-09-01 ENCOUNTER — TELEPHONE (OUTPATIENT)
Dept: FAMILY MEDICINE CLINIC | Facility: CLINIC | Age: 67
End: 2022-09-01

## 2022-09-01 NOTE — PROGRESS NOTES
Per Dr. Klein, Ms. Llanos  has been called with recent lab results & recommendations.  Continue current medications and follow-up as planned or sooner if any problems.

## 2022-09-01 NOTE — TELEPHONE ENCOUNTER
Per Dr. Klein, Ms. Llanos  has been called with recent lab results & recommendations.  Continue current medications and follow-up as planned or sooner if any problems.       ----- Message from Sharifa Klein MD sent at 8/31/2022 11:28 PM CDT -----  Magnesium ok.  Vitamin B12 low normal, would recommend daily supplement - sent to pharmacy.  Thanks, LUANN Klein

## 2022-09-03 LAB — VIT B6 SERPL-MCNC: 30.3 UG/L (ref 3.4–65.2)

## 2022-09-12 ENCOUNTER — OFFICE VISIT (OUTPATIENT)
Dept: FAMILY MEDICINE CLINIC | Facility: CLINIC | Age: 67
End: 2022-09-12

## 2022-09-12 VITALS
SYSTOLIC BLOOD PRESSURE: 122 MMHG | HEART RATE: 65 BPM | BODY MASS INDEX: 36.68 KG/M2 | OXYGEN SATURATION: 96 % | WEIGHT: 207 LBS | DIASTOLIC BLOOD PRESSURE: 82 MMHG | HEIGHT: 63 IN

## 2022-09-12 DIAGNOSIS — Z78.0 POSTMENOPAUSAL: ICD-10-CM

## 2022-09-12 DIAGNOSIS — I10 ESSENTIAL HYPERTENSION: ICD-10-CM

## 2022-09-12 DIAGNOSIS — G62.9 NEUROPATHY: ICD-10-CM

## 2022-09-12 DIAGNOSIS — E78.2 MIXED HYPERLIPIDEMIA: ICD-10-CM

## 2022-09-12 DIAGNOSIS — Z12.31 ENCOUNTER FOR SCREENING MAMMOGRAM FOR MALIGNANT NEOPLASM OF BREAST: ICD-10-CM

## 2022-09-12 DIAGNOSIS — Z23 PNEUMOCOCCAL VACCINE ADMINISTERED: ICD-10-CM

## 2022-09-12 DIAGNOSIS — Z00.00 WELCOME TO MEDICARE PREVENTIVE VISIT: Primary | ICD-10-CM

## 2022-09-12 PROCEDURE — 90677 PCV20 VACCINE IM: CPT | Performed by: FAMILY MEDICINE

## 2022-09-12 PROCEDURE — 1170F FXNL STATUS ASSESSED: CPT | Performed by: FAMILY MEDICINE

## 2022-09-12 PROCEDURE — 1126F AMNT PAIN NOTED NONE PRSNT: CPT | Performed by: FAMILY MEDICINE

## 2022-09-12 PROCEDURE — G0402 INITIAL PREVENTIVE EXAM: HCPCS | Performed by: FAMILY MEDICINE

## 2022-09-12 PROCEDURE — 1159F MED LIST DOCD IN RCRD: CPT | Performed by: FAMILY MEDICINE

## 2022-09-12 PROCEDURE — G0009 ADMIN PNEUMOCOCCAL VACCINE: HCPCS | Performed by: FAMILY MEDICINE

## 2022-09-12 RX ORDER — LORATADINE 10 MG/1
10 TABLET ORAL DAILY
COMMUNITY

## 2022-09-12 RX ORDER — GABAPENTIN 600 MG/1
1 TABLET ORAL NIGHTLY
COMMUNITY
Start: 2022-06-10 | End: 2022-09-12

## 2022-09-12 RX ORDER — TURMERIC/TURMERIC EXT/PEPR EXT 900-100 MG
CAPSULE ORAL
COMMUNITY
Start: 2022-08-19

## 2022-09-12 NOTE — ADDENDUM NOTE
Addended by: TOSHIA LOPEZ on: 9/12/2022 04:52 PM     Modules accepted: Orders     amiodarone-induced thyrotoxicosis  - cont prednisone 10mg daily  - cont methimazole 10mg daily  - adjustment in insulin dosages with initiation of prednisone  - thyroid and parathyroid US negative for nodules  - Endo, Dr. Perlman following, recs appreciated.

## 2022-09-12 NOTE — PROGRESS NOTES
The ABCs of the Annual Wellness Visit  [place] to Medicare Visit    Chief Complaint   Patient presents with   • Welcome To Medicare     Subjective {   History of Present Illness:  Ysabel Llanos is a 67 y.o. female who presents for a  Welcome to Medicare Visit.    The following portions of the patient's history were reviewed and   updated as appropriate: allergies, current medications, past family history, past medical history, past social history, past surgical history and problem list.     Compared to one year ago, the patient feels her physical   health is the same.    Compared to one year ago, the patient feels her mental   health is the same.    Recent Hospitalizations:  She was not admitted to the hospital during the last year.       Current Medical Providers:  Patient Care Team:  Sharifa Klein MD as PCP - General (Family Medicine)  Alejandro Arellano MD as Consulting Physician (Pain Medicine)  Toro Saxena MD as Consulting Physician (Dermatology)  Matthew Henry OD as Consulting Physician (Optometry)    Outpatient Medications Prior to Visit   Medication Sig Dispense Refill   • atenolol (TENORMIN) 25 MG tablet Take 1 tablet by mouth Daily. 90 tablet 3   • Black Pepper-Turmeric (Turmeric Curcumin) 5-1000 MG capsule      • D3-1000 25 MCG (1000 UT) capsule Take 1 capsule by mouth Daily. 90 capsule 3   • estradiol (ESTRACE) 1 MG tablet Take 1 tablet by mouth Daily. 90 tablet 3   • gabapentin (NEURONTIN) 600 MG tablet TAKE 1 TABLET EVERY NIGHT AT BEDTIME 90 tablet 2   • hydroCHLOROthiazide (HYDRODIURIL) 25 MG tablet Take 1 tablet by mouth Daily. 90 tablet 3   • loratadine (Claritin) 10 MG tablet Take 10 mg by mouth Daily.     • Magnesium 200 MG tablet Take 200 mg by mouth 2 (two) times a day.     • Multiple Vitamins-Minerals (WOMENS MULTI PO) Take  by mouth.     • rOPINIRole (REQUIP) 1 MG tablet Take 2.5 tablets by mouth Every Night for 90 days. Take 1 hour before bedtime. 225 tablet 1   • vitamin  "B-12 (CYANOCOBALAMIN) 1000 MCG tablet Take 1 tablet by mouth Daily. 30 tablet 5   • cetirizine (zyrTEC) 10 MG tablet Take 1 tablet by mouth Daily. 30 tablet 5   • cholestyramine light 4 g packet Take 1 packet by mouth 2 (Two) Times a Day. 180 packet 1   • gabapentin (NEURONTIN) 600 MG tablet Take 1 tablet by mouth Every Night.       No facility-administered medications prior to visit.       No opioid medication identified on active medication list. I have reviewed chart for other potential  high risk medication/s and harmful drug interactions in the elderly.          Aspirin is not on active medication list.  Aspirin use is not indicated based on review of current medical condition/s. Risk of harm outweighs potential benefits.  .    Patient Active Problem List   Diagnosis   • Essential hypertension   • Neuropathy   • Mixed hyperlipidemia     Advance Care Planning  Advance Directive is on file.  ACP discussion was held with the patient during this visit. Patient has an advance directive in EMR which is still valid.           Objective      Vitals:    09/12/22 0907   BP: 122/82   Pulse: 65   SpO2: 96%   Weight: 93.9 kg (207 lb)   Height: 160 cm (63\")   PainSc: 0-No pain     Estimated body mass index is 36.67 kg/m² as calculated from the following:    Height as of this encounter: 160 cm (63\").    Weight as of this encounter: 93.9 kg (207 lb).    Class 2 Severe Obesity (BMI >=35 and <=39.9). Obesity-related health conditions include the following: hypertension. Obesity is unchanged. BMI is is above average; BMI management plan is completed. We discussed increasing exercise.      Does the patient have evidence of cognitive impairment? No    Physical Exam              HEALTH RISK ASSESSMENT    Smoking Status:  Social History     Tobacco Use   Smoking Status Never Smoker   Smokeless Tobacco Never Used     Alcohol Consumption:  Social History     Substance and Sexual Activity   Alcohol Use Yes   • Alcohol/week: 2.0 standard " drinks   • Types: 1 Cans of beer, 1 Shots of liquor per week    Comment: 1 drink, 2-3 times per week       Fall Risk Screen:    RIA Fall Risk Assessment was completed, and patient is at LOW risk for falls.Assessment completed on:9/12/2022    Depression Screen:   PHQ-2/PHQ-9 Depression Screening 9/12/2022   Retired Total Score -   Little Interest or Pleasure in Doing Things 0-->not at all   Feeling Down, Depressed or Hopeless 0-->not at all   PHQ-9: Brief Depression Severity Measure Score 0       Health Habits and Functional and Cognitive Screening:  Functional & Cognitive Status 9/12/2022   Do you have difficulty preparing food and eating? No   Do you have difficulty bathing yourself, getting dressed or grooming yourself? No   Do you have difficulty using the toilet? No   Do you have difficulty moving around from place to place? No   Do you have trouble with steps or getting out of a bed or a chair? Yes   Current Diet Well Balanced Diet   Dental Exam Up to date   Eye Exam Not up to date   Exercise (times per week) 0 times per week   Do you need help using the phone?  No   Are you deaf or do you have serious difficulty hearing?  No   Do you need help with transportation? No   Do you need help shopping? No   Do you need help preparing meals?  No   Do you need help with housework?  No   Do you need help with laundry? No   Do you need help taking your medications? No   Do you need help managing money? No   Do you ever drive or ride in a car without wearing a seat belt? No   Have you felt unusual stress, anger or loneliness in the last month? No   Who do you live with? Spouse   If you need help, do you have trouble finding someone available to you? No   Have you been bothered in the last four weeks by sexual problems? No   Do you have difficulty concentrating, remembering or making decisions? No       Visual Acuity:     Visual Acuity Screening    Right eye Left eye Both eyes   Without correction: 20/30 20/200 20/40    With correction:          Age-appropriate Screening Schedule:  Refer to the list below for future screening recommendations based on patient's age, sex and/or medical conditions. Orders for these recommended tests are listed in the plan section. The patient has been provided with a written plan.    Health Maintenance   Topic Date Due   • DXA SCAN  Never done   • TDAP/TD VACCINES (1 - Tdap) Never done   • ZOSTER VACCINE (1 of 2) Never done   • INFLUENZA VACCINE  10/01/2022   • LIPID PANEL  06/06/2023   • MAMMOGRAM  07/16/2023   • PAP SMEAR  Discontinued          Assessment & Plan   CMS Preventative Services Quick Reference  Risk Factors Identified During Encounter  Chronic Pain   Obesity/Overweight   The above risks/problems have been discussed with the patient.  Pertinent information has been shared with the patient in the After Visit Summary.  Follow up plans and orders are seen below in the Assessment/Plan Section.    Diagnoses and all orders for this visit:    1. Welcome to Medicare preventive visit (Primary)    2. Encounter for screening mammogram for malignant neoplasm of breast  -     Mammo Screening Digital Tomosynthesis Bilateral With CAD; Future    3. Postmenopausal  -     DEXA Bone Density Axial; Future    4. Essential hypertension    5. Neuropathy    6. Mixed hyperlipidemia    7. Pneumococcal vaccine administered      Medicare wellness visit completed today   Due for mammogram and DEXA bone scan, ordered  Hypertension controlled  Continue gabapentin for neuropathy, no refill needed today  Patient counseled on recommended immunizations, pneumonia, influenza and Shingrix  Pneumonia 20 given today  Patient will call in a couple weeks to see about getting influenza vaccine  We will check with pharmacy for Shingrix      Follow Up:   Return in about 4 months (around 1/12/2023) for Recheck.     An After Visit Summary and PPPS were made available to the patient.                   This document has been  electronically signed by Sharifa Klein MD

## 2022-09-28 ENCOUNTER — TELEPHONE (OUTPATIENT)
Dept: FAMILY MEDICINE CLINIC | Facility: CLINIC | Age: 67
End: 2022-09-28

## 2022-09-28 NOTE — TELEPHONE ENCOUNTER
Per Dr. Duggan,  has been called with recent DEXA Bone Density Scan results & recommendations.  Continue current medications and follow-up as planned or sooner if any problems.       ----- Message from Sharifa Klein MD sent at 9/28/2022 10:39 AM CDT -----  Normal bone density, repeat in 2 years.  Thanks, LUANN Klein

## 2022-10-31 DIAGNOSIS — G25.81 RESTLESS LEGS SYNDROME: ICD-10-CM

## 2022-10-31 RX ORDER — ROPINIROLE 1 MG/1
TABLET, FILM COATED ORAL
Qty: 225 TABLET | Refills: 1 | Status: SHIPPED | OUTPATIENT
Start: 2022-10-31

## 2023-01-13 ENCOUNTER — OFFICE VISIT (OUTPATIENT)
Dept: FAMILY MEDICINE CLINIC | Facility: CLINIC | Age: 68
End: 2023-01-13
Payer: MEDICARE

## 2023-01-13 ENCOUNTER — LAB (OUTPATIENT)
Dept: LAB | Facility: HOSPITAL | Age: 68
End: 2023-01-13
Payer: MEDICARE

## 2023-01-13 VITALS
HEART RATE: 63 BPM | HEIGHT: 63 IN | OXYGEN SATURATION: 96 % | BODY MASS INDEX: 37.92 KG/M2 | WEIGHT: 214 LBS | DIASTOLIC BLOOD PRESSURE: 90 MMHG | SYSTOLIC BLOOD PRESSURE: 148 MMHG

## 2023-01-13 DIAGNOSIS — G62.9 NEUROPATHY: ICD-10-CM

## 2023-01-13 DIAGNOSIS — G25.81 RESTLESS LEGS SYNDROME: Primary | ICD-10-CM

## 2023-01-13 DIAGNOSIS — Z51.81 ENCOUNTER FOR THERAPEUTIC DRUG LEVEL MONITORING: ICD-10-CM

## 2023-01-13 DIAGNOSIS — R19.5 DARK STOOLS: ICD-10-CM

## 2023-01-13 DIAGNOSIS — Z79.899 OTHER LONG TERM (CURRENT) DRUG THERAPY: ICD-10-CM

## 2023-01-13 DIAGNOSIS — I10 ESSENTIAL HYPERTENSION: ICD-10-CM

## 2023-01-13 DIAGNOSIS — G25.81 RESTLESS LEGS SYNDROME: ICD-10-CM

## 2023-01-13 LAB
ALBUMIN SERPL-MCNC: 4.5 G/DL (ref 3.5–5.2)
ALBUMIN/GLOB SERPL: 1.6 G/DL
ALP SERPL-CCNC: 76 U/L (ref 39–117)
ALT SERPL W P-5'-P-CCNC: 25 U/L (ref 1–33)
AMPHET+METHAMPHET UR QL: NEGATIVE
AMPHETAMINES UR QL: NEGATIVE
ANION GAP SERPL CALCULATED.3IONS-SCNC: 8.9 MMOL/L (ref 5–15)
AST SERPL-CCNC: 29 U/L (ref 1–32)
BARBITURATES UR QL SCN: NEGATIVE
BENZODIAZ UR QL SCN: NEGATIVE
BILIRUB SERPL-MCNC: 0.3 MG/DL (ref 0–1.2)
BUN SERPL-MCNC: 14 MG/DL (ref 8–23)
BUN/CREAT SERPL: 15.4 (ref 7–25)
BUPRENORPHINE SERPL-MCNC: NEGATIVE NG/ML
CALCIUM SPEC-SCNC: 9.8 MG/DL (ref 8.6–10.5)
CANNABINOIDS SERPL QL: NEGATIVE
CHLORIDE SERPL-SCNC: 98 MMOL/L (ref 98–107)
CO2 SERPL-SCNC: 30.1 MMOL/L (ref 22–29)
COCAINE UR QL: NEGATIVE
CREAT SERPL-MCNC: 0.91 MG/DL (ref 0.57–1)
EGFRCR SERPLBLD CKD-EPI 2021: 70.2 ML/MIN/1.73
GLOBULIN UR ELPH-MCNC: 2.8 GM/DL
GLUCOSE SERPL-MCNC: 89 MG/DL (ref 65–99)
METHADONE UR QL SCN: NEGATIVE
OPIATES UR QL: NEGATIVE
OXYCODONE UR QL SCN: NEGATIVE
PCP UR QL SCN: NEGATIVE
POTASSIUM SERPL-SCNC: 3.7 MMOL/L (ref 3.5–5.2)
PROPOXYPH UR QL: NEGATIVE
PROT SERPL-MCNC: 7.3 G/DL (ref 6–8.5)
SODIUM SERPL-SCNC: 137 MMOL/L (ref 136–145)
TRICYCLICS UR QL SCN: NEGATIVE

## 2023-01-13 PROCEDURE — G0480 DRUG TEST DEF 1-7 CLASSES: HCPCS | Performed by: FAMILY MEDICINE

## 2023-01-13 PROCEDURE — 80306 DRUG TEST PRSMV INSTRMNT: CPT | Performed by: FAMILY MEDICINE

## 2023-01-13 PROCEDURE — 99214 OFFICE O/P EST MOD 30 MIN: CPT | Performed by: FAMILY MEDICINE

## 2023-01-13 PROCEDURE — 80053 COMPREHEN METABOLIC PANEL: CPT

## 2023-01-13 PROCEDURE — 36415 COLL VENOUS BLD VENIPUNCTURE: CPT

## 2023-01-13 RX ORDER — LANOLIN ALCOHOL/MO/W.PET/CERES
1000 CREAM (GRAM) TOPICAL DAILY
Qty: 90 TABLET | Refills: 3 | Status: SHIPPED | OUTPATIENT
Start: 2023-01-13

## 2023-01-13 RX ORDER — IBUPROFEN 800 MG/1
800 TABLET ORAL EVERY 8 HOURS
COMMUNITY
Start: 2022-12-27

## 2023-01-13 NOTE — PROGRESS NOTES
Chief Complaint  Hypertension    Subjective    History of Present Illness {CC  Problem List  Visit  Diagnosis   Encounters  Notes  Medications  Labs  Result Review Imaging  Media :23}     Ysabel Llanos presents to [facility] [place] for     Chief Complaint   Patient presents with   • Hypertension      Patient seen today for follow up.  Has chronic medical problems including hypertension, restless legs and neuropathy.  On gabapentin, which continues to help.  Adherent to medication regimen.  Notes that blood pressure controlled at home, monitoring.  She is trying to be more active, but no scheduled exercise.  Has noticed some dark stools.         Current Outpatient Medications:   •  atenolol (TENORMIN) 25 MG tablet, Take 1 tablet by mouth Daily., Disp: 90 tablet, Rfl: 3  •  Black Pepper-Turmeric (Turmeric Curcumin) 5-1000 MG capsule, , Disp: , Rfl:   •  D3-1000 25 MCG (1000 UT) capsule, Take 1 capsule by mouth Daily., Disp: 90 capsule, Rfl: 3  •  estradiol (ESTRACE) 1 MG tablet, Take 1 tablet by mouth Daily., Disp: 90 tablet, Rfl: 3  •  gabapentin (NEURONTIN) 600 MG tablet, TAKE 1 TABLET EVERY NIGHT AT BEDTIME, Disp: 90 tablet, Rfl: 2  •  hydroCHLOROthiazide (HYDRODIURIL) 25 MG tablet, Take 1 tablet by mouth Daily., Disp: 90 tablet, Rfl: 3  •  ibuprofen (ADVIL,MOTRIN) 800 MG tablet, Take 800 mg by mouth Every 8 (Eight) Hours., Disp: , Rfl:   •  loratadine (CLARITIN) 10 MG tablet, Take 10 mg by mouth Daily., Disp: , Rfl:   •  Magnesium 200 MG tablet, Take 200 mg by mouth 2 (two) times a day., Disp: , Rfl:   •  Multiple Vitamins-Minerals (WOMENS MULTI PO), Take  by mouth., Disp: , Rfl:   •  rOPINIRole (REQUIP) 1 MG tablet, TAKE 2 AND 1/2 TABLETS EVERY NIGHT 1 HOUR BEFORE BEDTIME, Disp: 225 tablet, Rfl: 1  •  vitamin B-12 (CYANOCOBALAMIN) 1000 MCG tablet, Take 1 tablet by mouth Daily., Disp: 30 tablet, Rfl: 5     Objective       Vital Signs:   /90   Pulse  "63   Ht 160 cm (63\")   Wt 97.1 kg (214 lb)   SpO2 96%   BMI 37.91 kg/m²     Physical Exam  Vitals reviewed.   Constitutional:       General: She is not in acute distress.     Appearance: She is well-developed.   Cardiovascular:      Rate and Rhythm: Normal rate and regular rhythm.      Heart sounds: Normal heart sounds. No murmur heard.  Pulmonary:      Effort: Pulmonary effort is normal. No respiratory distress.      Breath sounds: Normal breath sounds. No wheezing or rales.   Skin:     General: Skin is warm and dry.   Neurological:      Mental Status: She is alert and oriented to person, place, and time.        Result Review :{ Labs  Result Review  Imaging  Med Tab  Media :23}   The following data was reviewed by: Sharifa Klein MD on 01/13/2023    Common labs    Common Labs 6/6/22 6/6/22 6/6/22 6/6/22    1146 1146 1146 1146   Glucose  98     BUN  11     Creatinine  0.71     Sodium  138     Potassium  3.7     Chloride  100     Calcium  10.0     Albumin  4.50     Total Bilirubin  0.3     Alkaline Phosphatase  69     AST (SGOT)  34 (A)     ALT (SGPT)  34 (A)     WBC   6.14    Hemoglobin   14.1    Hematocrit   41.9    Platelets   267    Total Cholesterol 188      Triglycerides 160 (A)      HDL Cholesterol 53      LDL Cholesterol  107 (A)      Hemoglobin A1C    5.20   (A) Abnormal value                      Assessment and Plan {CC Problem List  Visit Diagnosis  ROS  Review (Popup)  Health Maintenance  Quality  BestPractice  Medications  SmartSets  SnapShot Encounters  Media :23}   Diagnoses and all orders for this visit:    1. Restless legs syndrome (Primary)  -     Gabapentin, Urine - Urine, Clean Catch  -     Urine Drug Screen - Urine, Clean Catch  -     Comprehensive Metabolic Panel; Future    2. Neuropathy  -     Gabapentin, Urine - Urine, Clean Catch  -     Urine Drug Screen - Urine, Clean Catch    3. Encounter for therapeutic drug level monitoring  -     Gabapentin, Urine - Urine, Clean " Catch    4. Other long term (current) drug therapy  -     Urine Drug Screen - Urine, Clean Catch    5. Essential hypertension    6. Dark stools  -     Comprehensive Metabolic Panel; Future  -     Occult Blood X 1, Stool - Stool, Per Rectum; Future    Other orders  -     vitamin B-12 (CYANOCOBALAMIN) 1000 MCG tablet; Take 1 tablet by mouth Daily.  Dispense: 90 tablet; Refill: 3      Patient seen today for follow up  Hypertension controlled at home, elevated here  She will continue home monitoring and current medication  Gabapentin to help with restless legs and neuropathy, controlled  Urine drug testing done today for monitoring  Patient has controlled substance agreement on file  Occult blood for evaluation of dark stools  Last colonoscopy 2017      Follow Up {Instructions Charge Capture  Follow-up Communications :23}   Return in about 4 months (around 5/13/2023) for Recheck.  Patient was given instructions and counseling regarding her condition or for health maintenance advice. Please see specific information pulled into the AVS if appropriate.            This document has been electronically signed by Sharifa Klein MD

## 2023-01-16 ENCOUNTER — TELEPHONE (OUTPATIENT)
Dept: FAMILY MEDICINE CLINIC | Facility: CLINIC | Age: 68
End: 2023-01-16
Payer: MEDICARE

## 2023-01-16 ENCOUNTER — LAB (OUTPATIENT)
Dept: LAB | Facility: HOSPITAL | Age: 68
End: 2023-01-16
Payer: MEDICARE

## 2023-01-16 DIAGNOSIS — R19.5 DARK STOOLS: ICD-10-CM

## 2023-01-16 LAB — HEMOCCULT STL QL: NEGATIVE

## 2023-01-16 PROCEDURE — 82272 OCCULT BLD FECES 1-3 TESTS: CPT

## 2023-01-17 NOTE — TELEPHONE ENCOUNTER
Per Dr. Klein, Ms. Llanos has been called with recent lab results & recommendations.  Continue current medications and follow-up as planned or sooner if any problems.       ----- Message from Sharifa Klein MD sent at 1/16/2023 12:07 PM CST -----  [facility] ok.  ThanksLUANN

## 2023-01-17 NOTE — TELEPHONE ENCOUNTER
Per Dr. Klein, Ms. Llanos has been called with recent lab results & recommendations.  Continue current medications and follow-up as planned or sooner if any problems.       ----- Message from Sharifa Klein MD sent at 1/16/2023 12:07 PM CST -----  No blood in stool.  ThanksLUANN

## 2023-01-18 LAB — GABAPENTIN UR-MCNC: 66.5 UG/ML

## 2023-02-28 DIAGNOSIS — G62.9 NEUROPATHY: ICD-10-CM

## 2023-03-02 RX ORDER — GABAPENTIN 600 MG/1
TABLET ORAL
Qty: 90 TABLET | Refills: 0 | Status: SHIPPED | OUTPATIENT
Start: 2023-03-02

## 2023-05-15 ENCOUNTER — OFFICE VISIT (OUTPATIENT)
Dept: FAMILY MEDICINE CLINIC | Facility: CLINIC | Age: 68
End: 2023-05-15

## 2023-05-15 VITALS
OXYGEN SATURATION: 97 % | DIASTOLIC BLOOD PRESSURE: 90 MMHG | BODY MASS INDEX: 38.09 KG/M2 | WEIGHT: 215 LBS | SYSTOLIC BLOOD PRESSURE: 150 MMHG | HEIGHT: 63 IN | HEART RATE: 76 BPM

## 2023-05-15 DIAGNOSIS — G62.9 NEUROPATHY: ICD-10-CM

## 2023-05-15 DIAGNOSIS — F32.0 CURRENT MILD EPISODE OF MAJOR DEPRESSIVE DISORDER, UNSPECIFIED WHETHER RECURRENT: ICD-10-CM

## 2023-05-15 DIAGNOSIS — G25.81 RESTLESS LEGS SYNDROME: ICD-10-CM

## 2023-05-15 DIAGNOSIS — J30.2 SEASONAL ALLERGIES: ICD-10-CM

## 2023-05-15 DIAGNOSIS — I10 ESSENTIAL HYPERTENSION: Primary | ICD-10-CM

## 2023-05-15 PROCEDURE — 1159F MED LIST DOCD IN RCRD: CPT | Performed by: FAMILY MEDICINE

## 2023-05-15 PROCEDURE — 99214 OFFICE O/P EST MOD 30 MIN: CPT | Performed by: FAMILY MEDICINE

## 2023-05-15 PROCEDURE — 3077F SYST BP >= 140 MM HG: CPT | Performed by: FAMILY MEDICINE

## 2023-05-15 PROCEDURE — 1160F RVW MEDS BY RX/DR IN RCRD: CPT | Performed by: FAMILY MEDICINE

## 2023-05-15 PROCEDURE — 3080F DIAST BP >= 90 MM HG: CPT | Performed by: FAMILY MEDICINE

## 2023-05-15 RX ORDER — FEXOFENADINE HCL 180 MG/1
180 TABLET ORAL DAILY
Qty: 90 TABLET | Refills: 3 | Status: SHIPPED | OUTPATIENT
Start: 2023-05-15

## 2023-05-15 RX ORDER — BUPROPION HYDROCHLORIDE 150 MG/1
150 TABLET ORAL DAILY
Qty: 90 TABLET | Refills: 1 | Status: SHIPPED | OUTPATIENT
Start: 2023-05-15

## 2023-05-15 RX ORDER — TRIAMCINOLONE ACETONIDE 55 UG/1
2 SPRAY, METERED NASAL DAILY
Qty: 16.9 ML | Refills: 3 | Status: SHIPPED | OUTPATIENT
Start: 2023-05-15 | End: 2024-05-14

## 2023-05-15 RX ORDER — GABAPENTIN 600 MG/1
600 TABLET ORAL
Qty: 90 TABLET | Refills: 0 | Status: SHIPPED | OUTPATIENT
Start: 2023-05-15

## 2023-05-15 NOTE — PROGRESS NOTES
"Chief Complaint  Restless Legs Syndrome    Subjective    History of Present Illness {CC  Problem List  Visit  Diagnosis   Encounters  Notes  Medications  Labs  Result Review Imaging  Media :23}     Ysabel Llanos presents to [facility] [place] for     Chief Complaint   Patient presents with   • Restless Legs Syndrome      Patient seen today for follow up.  Notes that she has not been feeling as well as before.  Blood pressure has been generally well controlled.  She has noticed more irritability.  She had some episodes of \"dizzy\" feeling and increased frequency of intermittent headaches.  She hs been watching her blood glucose levels some, and notes that fasting glucose is around 110.  Has not checked blood glucose or blood sugar during these episodes. Taking requip for restless legs.      Answers for HPI/ROS submitted by the patient on 5/13/2023  Please describe your symptoms.: Checkup  Have you had these symptoms before?: No  How long have you been having these symptoms?: Greater than 2 weeks  Please list any medications you are currently taking for this condition.: Na  Please describe any probable cause for these symptoms. : Na  What is the primary reason for your visit?: Other      Current Outpatient Medications:   •  atenolol (TENORMIN) 25 MG tablet, Take 1 tablet by mouth Daily., Disp: 90 tablet, Rfl: 3  •  Black Pepper-Turmeric (Turmeric Curcumin) 5-1000 MG capsule, , Disp: , Rfl:   •  D3-1000 25 MCG (1000 UT) capsule, Take 1 capsule by mouth Daily., Disp: 90 capsule, Rfl: 3  •  estradiol (ESTRACE) 1 MG tablet, Take 1 tablet by mouth Daily., Disp: 90 tablet, Rfl: 3  •  gabapentin (NEURONTIN) 600 MG tablet, TAKE 1 TABLET AT BEDTIME, Disp: 90 tablet, Rfl: 0  •  hydroCHLOROthiazide (HYDRODIURIL) 25 MG tablet, Take 1 tablet by mouth Daily., Disp: 90 tablet, Rfl: 3  •  ibuprofen (ADVIL,MOTRIN) 800 MG tablet, Take 1 tablet by mouth Every 8 (Eight) Hours., " "Disp: , Rfl:   •  loratadine (CLARITIN) 10 MG tablet, Take 1 tablet by mouth Daily., Disp: , Rfl:   •  Magnesium 200 MG tablet, Take 200 mg by mouth 2 (two) times a day., Disp: , Rfl:   •  Multiple Vitamins-Minerals (WOMENS MULTI PO), Take  by mouth., Disp: , Rfl:   •  rOPINIRole (REQUIP) 1 MG tablet, TAKE 2 AND 1/2 TABLETS EVERY NIGHT 1 HOUR BEFORE BEDTIME, Disp: 225 tablet, Rfl: 1  •  vitamin B-12 (CYANOCOBALAMIN) 1000 MCG tablet, Take 1 tablet by mouth Daily., Disp: 90 tablet, Rfl: 3     Objective       Vital Signs:   /90   Pulse 76   Ht 160 cm (63\")   Wt 97.5 kg (215 lb)   SpO2 97%   BMI 38.09 kg/m²     Physical Exam  Vitals reviewed.   Constitutional:       General: She is not in acute distress.     Appearance: She is well-developed.   Cardiovascular:      Rate and Rhythm: Normal rate and regular rhythm.      Heart sounds: Normal heart sounds. No murmur heard.  Pulmonary:      Effort: Pulmonary effort is normal. No respiratory distress.      Breath sounds: Normal breath sounds. No wheezing or rales.   Skin:     General: Skin is warm and dry.      Findings: No rash.   Neurological:      Mental Status: She is alert and oriented to person, place, and time.        Result Review :{ Labs  Result Review  Imaging  Med Tab  Media :23}   The following data was reviewed by: Sharifa Klein MD on 05/15/2023    Common labs        6/6/2022    11:46 1/13/2023    11:50   Common Labs   Glucose 98   89     BUN 11   14     Creatinine 0.71   0.91     Sodium 138   137     Potassium 3.7   3.7     Chloride 100   98     Calcium 10.0   9.8     Albumin 4.50   4.5     Total Bilirubin 0.3   0.3     Alkaline Phosphatase 69   76     AST (SGOT) 34   29     ALT (SGPT) 34   25     WBC 6.14      Hemoglobin 14.1      Hematocrit 41.9      Platelets 267      Total Cholesterol 188      Triglycerides 160      HDL Cholesterol 53      LDL Cholesterol  107      Hemoglobin A1C 5.20                Assessment and Plan {CC Problem List  " "Visit Diagnosis  ROS  Review (Popup)  Health Maintenance  Quality  BestPractice  Medications  SmartSets  SnapShot Encounters  Media :23}   Diagnoses and all orders for this visit:    1. Essential hypertension (Primary)    2. Restless legs syndrome    3. Neuropathy  -     gabapentin (NEURONTIN) 600 MG tablet; Take 1 tablet by mouth every night at bedtime.  Dispense: 90 tablet; Refill: 0    4. Current mild episode of major depressive disorder, unspecified whether recurrent  -     buPROPion XL (Wellbutrin XL) 150 MG 24 hr tablet; Take 1 tablet by mouth Daily.  Dispense: 90 tablet; Refill: 1    5. Seasonal allergies  -     Triamcinolone Acetonide (NASACORT) 55 MCG/ACT nasal inhaler; 2 sprays into the nostril(s) as directed by provider Daily.  Dispense: 16.9 mL; Refill: 3  -     fexofenadine (Allegra Allergy) 180 MG tablet; Take 1 tablet by mouth Daily.  Dispense: 90 tablet; Refill: 3      Patient seen for follow up  Hypertension elevated during visit today  Patient will monitor at home, bring/call with log in about 2 weeks to make sure no persistent elevations  Restless legs controlled, continue current dose of requip  Gabapentin for neuropathy, controlled  Irritability likely sign of depression  Trial treatment with Wellbutrin, risks and benefits of this medication discussed  Recommended that patient monitor blood pressure and blood glucose during the \"dizzy\" spells that she is experiencing  Encouraged good fluid intake and regular exercise  Treatment of seasonal allergies, as this may be causing/contributing to these symptoms      Follow Up {Instructions Charge Capture  Follow-up Communications :23}   Return in about 3 months (around 8/15/2023) for Recheck.  Patient was given instructions and counseling regarding her condition or for health maintenance advice. Please see specific information pulled into the AVS if appropriate.          This document has been electronically signed by Sharifa Klein MD "

## 2023-05-27 DIAGNOSIS — N95.9 POSTMENOPAUSAL SYMPTOMS: ICD-10-CM

## 2023-05-27 DIAGNOSIS — I10 ESSENTIAL HYPERTENSION: ICD-10-CM

## 2023-05-30 RX ORDER — HYDROCHLOROTHIAZIDE 25 MG/1
TABLET ORAL
Qty: 90 TABLET | Refills: 3 | Status: SHIPPED | OUTPATIENT
Start: 2023-05-30

## 2023-05-30 RX ORDER — ATENOLOL 25 MG/1
TABLET ORAL
Qty: 90 TABLET | Refills: 3 | Status: SHIPPED | OUTPATIENT
Start: 2023-05-30

## 2023-05-30 RX ORDER — ESTRADIOL 1 MG/1
TABLET ORAL
Qty: 90 TABLET | Refills: 3 | Status: SHIPPED | OUTPATIENT
Start: 2023-05-30

## 2023-08-11 DIAGNOSIS — G25.81 RESTLESS LEGS SYNDROME: ICD-10-CM

## 2023-08-11 DIAGNOSIS — G62.9 NEUROPATHY: ICD-10-CM

## 2023-08-11 RX ORDER — ROPINIROLE 1 MG/1
TABLET, FILM COATED ORAL
Qty: 225 TABLET | Refills: 1 | Status: SHIPPED | OUTPATIENT
Start: 2023-08-11

## 2023-08-11 RX ORDER — GABAPENTIN 600 MG/1
TABLET ORAL
Qty: 90 TABLET | Refills: 0 | Status: SHIPPED | OUTPATIENT
Start: 2023-08-11

## 2023-08-15 ENCOUNTER — LAB (OUTPATIENT)
Dept: LAB | Facility: HOSPITAL | Age: 68
End: 2023-08-15
Payer: MEDICARE

## 2023-08-15 ENCOUNTER — OFFICE VISIT (OUTPATIENT)
Dept: FAMILY MEDICINE CLINIC | Facility: CLINIC | Age: 68
End: 2023-08-15
Payer: MEDICARE

## 2023-08-15 VITALS
BODY MASS INDEX: 37.74 KG/M2 | HEIGHT: 63 IN | WEIGHT: 213 LBS | SYSTOLIC BLOOD PRESSURE: 120 MMHG | HEART RATE: 70 BPM | DIASTOLIC BLOOD PRESSURE: 88 MMHG | OXYGEN SATURATION: 95 %

## 2023-08-15 DIAGNOSIS — I10 ESSENTIAL HYPERTENSION: ICD-10-CM

## 2023-08-15 DIAGNOSIS — G25.81 RESTLESS LEGS SYNDROME: ICD-10-CM

## 2023-08-15 DIAGNOSIS — G62.9 NEUROPATHY: ICD-10-CM

## 2023-08-15 DIAGNOSIS — R73.09 ELEVATED GLUCOSE: ICD-10-CM

## 2023-08-15 DIAGNOSIS — I10 ESSENTIAL HYPERTENSION: Primary | ICD-10-CM

## 2023-08-15 DIAGNOSIS — F32.0 CURRENT MILD EPISODE OF MAJOR DEPRESSIVE DISORDER, UNSPECIFIED WHETHER RECURRENT: ICD-10-CM

## 2023-08-15 DIAGNOSIS — Z12.31 ENCOUNTER FOR SCREENING MAMMOGRAM FOR MALIGNANT NEOPLASM OF BREAST: ICD-10-CM

## 2023-08-15 DIAGNOSIS — L30.9 DERMATITIS: ICD-10-CM

## 2023-08-15 LAB
ALBUMIN SERPL-MCNC: 4.6 G/DL (ref 3.5–5.2)
ALBUMIN/GLOB SERPL: 1.6 G/DL
ALP SERPL-CCNC: 82 U/L (ref 39–117)
ALT SERPL W P-5'-P-CCNC: 35 U/L (ref 1–33)
ANION GAP SERPL CALCULATED.3IONS-SCNC: 11 MMOL/L (ref 5–15)
AST SERPL-CCNC: 40 U/L (ref 1–32)
BASOPHILS # BLD AUTO: 0.05 10*3/MM3 (ref 0–0.2)
BASOPHILS NFR BLD AUTO: 0.7 % (ref 0–1.5)
BILIRUB SERPL-MCNC: 0.3 MG/DL (ref 0–1.2)
BUN SERPL-MCNC: 12 MG/DL (ref 8–23)
BUN/CREAT SERPL: 13.6 (ref 7–25)
CALCIUM SPEC-SCNC: 10 MG/DL (ref 8.6–10.5)
CHLORIDE SERPL-SCNC: 100 MMOL/L (ref 98–107)
CO2 SERPL-SCNC: 27 MMOL/L (ref 22–29)
CREAT SERPL-MCNC: 0.88 MG/DL (ref 0.57–1)
DEPRECATED RDW RBC AUTO: 43.1 FL (ref 37–54)
EGFRCR SERPLBLD CKD-EPI 2021: 72.6 ML/MIN/1.73
EOSINOPHIL # BLD AUTO: 0.12 10*3/MM3 (ref 0–0.4)
EOSINOPHIL NFR BLD AUTO: 1.7 % (ref 0.3–6.2)
ERYTHROCYTE [DISTWIDTH] IN BLOOD BY AUTOMATED COUNT: 13.7 % (ref 12.3–15.4)
GLOBULIN UR ELPH-MCNC: 2.8 GM/DL
GLUCOSE SERPL-MCNC: 86 MG/DL (ref 65–99)
HBA1C MFR BLD: 5.7 % (ref 4.8–5.6)
HCT VFR BLD AUTO: 39.1 % (ref 34–46.6)
HGB BLD-MCNC: 13.9 G/DL (ref 12–15.9)
IMM GRANULOCYTES # BLD AUTO: 0.02 10*3/MM3 (ref 0–0.05)
IMM GRANULOCYTES NFR BLD AUTO: 0.3 % (ref 0–0.5)
LYMPHOCYTES # BLD AUTO: 1.64 10*3/MM3 (ref 0.7–3.1)
LYMPHOCYTES NFR BLD AUTO: 22.7 % (ref 19.6–45.3)
MCH RBC QN AUTO: 31.2 PG (ref 26.6–33)
MCHC RBC AUTO-ENTMCNC: 35.5 G/DL (ref 31.5–35.7)
MCV RBC AUTO: 87.7 FL (ref 79–97)
MONOCYTES # BLD AUTO: 0.46 10*3/MM3 (ref 0.1–0.9)
MONOCYTES NFR BLD AUTO: 6.4 % (ref 5–12)
NEUTROPHILS NFR BLD AUTO: 4.94 10*3/MM3 (ref 1.7–7)
NEUTROPHILS NFR BLD AUTO: 68.2 % (ref 42.7–76)
NRBC BLD AUTO-RTO: 0 /100 WBC (ref 0–0.2)
PLATELET # BLD AUTO: 259 10*3/MM3 (ref 140–450)
PMV BLD AUTO: 11.2 FL (ref 6–12)
POTASSIUM SERPL-SCNC: 4.1 MMOL/L (ref 3.5–5.2)
PROT SERPL-MCNC: 7.4 G/DL (ref 6–8.5)
RBC # BLD AUTO: 4.46 10*6/MM3 (ref 3.77–5.28)
SODIUM SERPL-SCNC: 138 MMOL/L (ref 136–145)
WBC NRBC COR # BLD: 7.23 10*3/MM3 (ref 3.4–10.8)

## 2023-08-15 PROCEDURE — 3079F DIAST BP 80-89 MM HG: CPT | Performed by: FAMILY MEDICINE

## 2023-08-15 PROCEDURE — 36415 COLL VENOUS BLD VENIPUNCTURE: CPT

## 2023-08-15 PROCEDURE — 3074F SYST BP LT 130 MM HG: CPT | Performed by: FAMILY MEDICINE

## 2023-08-15 PROCEDURE — 85025 COMPLETE CBC W/AUTO DIFF WBC: CPT

## 2023-08-15 PROCEDURE — 99214 OFFICE O/P EST MOD 30 MIN: CPT | Performed by: FAMILY MEDICINE

## 2023-08-15 PROCEDURE — 83036 HEMOGLOBIN GLYCOSYLATED A1C: CPT

## 2023-08-15 PROCEDURE — 80053 COMPREHEN METABOLIC PANEL: CPT

## 2023-08-15 RX ORDER — TRIAMCINOLONE ACETONIDE 0.25 MG/G
1 CREAM TOPICAL 2 TIMES DAILY
Qty: 45 G | Refills: 0 | Status: SHIPPED | OUTPATIENT
Start: 2023-08-15 | End: 2023-08-22

## 2023-08-15 NOTE — PROGRESS NOTES
Chief Complaint  Hypertension    Subjective    History of Present Illness {CC  Problem List  Visit  Diagnosis   Encounters  Notes  Medications  Labs  Result Review Imaging  Media :23}     Ysabel Llanos presents to [facility] [place] for     Chief Complaint   Patient presents with    Hypertension      Patient seen today for 3-month follow-up.  Notes that she has been doing well since last office visit.  Has chronic medical problems including hypertension, neuropathy, restless leg syndromes, seasonal allergies and depression.  Has been staying active with work.  Does some walking for exercise.  Hypertension has been controlled with current medication regimen.  Patient does have pruritic skin rash on left side of abdomen.    Wt Readings from Last 3 Encounters:   08/15/23 96.6 kg (213 lb)   05/15/23 97.5 kg (215 lb)   01/13/23 97.1 kg (214 lb)        Current Outpatient Medications:     atenolol (TENORMIN) 25 MG tablet, TAKE 1 TABLET EVERY DAY, Disp: 90 tablet, Rfl: 3    Black Pepper-Turmeric (Turmeric Curcumin) 5-1000 MG capsule, , Disp: , Rfl:     buPROPion XL (Wellbutrin XL) 150 MG 24 hr tablet, Take 1 tablet by mouth Daily., Disp: 90 tablet, Rfl: 1    D3-1000 25 MCG (1000 UT) capsule, Take 1 capsule by mouth Daily., Disp: 90 capsule, Rfl: 3    estradiol (ESTRACE) 1 MG tablet, TAKE 1 TABLET EVERY DAY, Disp: 90 tablet, Rfl: 3    fexofenadine (Allegra Allergy) 180 MG tablet, Take 1 tablet by mouth Daily., Disp: 90 tablet, Rfl: 3    gabapentin (NEURONTIN) 600 MG tablet, TAKE 1 TABLET AT BEDTIME, Disp: 90 tablet, Rfl: 0    hydroCHLOROthiazide (HYDRODIURIL) 25 MG tablet, TAKE 1 TABLET EVERY DAY, Disp: 90 tablet, Rfl: 3    ibuprofen (ADVIL,MOTRIN) 800 MG tablet, Take 1 tablet by mouth Every 8 (Eight) Hours., Disp: , Rfl:     Magnesium 200 MG tablet, Take 200 mg by mouth 2 (two) times a day., Disp: , Rfl:     Multiple Vitamins-Minerals (WOMENS MULTI PO), Take  by  "mouth., Disp: , Rfl:     rOPINIRole (REQUIP) 1 MG tablet, TAKE 2 AND 1/2 TABLETS EVERY NIGHT 1 HOUR BEFORE BEDTIME, Disp: 225 tablet, Rfl: 1    Triamcinolone Acetonide (NASACORT) 55 MCG/ACT nasal inhaler, 2 sprays into the nostril(s) as directed by provider Daily., Disp: 16.9 mL, Rfl: 3    vitamin B-12 (CYANOCOBALAMIN) 1000 MCG tablet, Take 1 tablet by mouth Daily., Disp: 90 tablet, Rfl: 3     Objective       Vital Signs:   /88   Pulse 70   Ht 160 cm (63\")   Wt 96.6 kg (213 lb)   SpO2 95%   BMI 37.73 kg/mý     Physical Exam  Vitals reviewed.   Constitutional:       General: She is not in acute distress.     Appearance: She is well-developed.   Cardiovascular:      Rate and Rhythm: Normal rate and regular rhythm.      Heart sounds: Normal heart sounds. No murmur heard.  Pulmonary:      Effort: Pulmonary effort is normal. No respiratory distress.      Breath sounds: Normal breath sounds. No wheezing or rales.   Abdominal:      Palpations: Abdomen is soft.      Tenderness: There is no abdominal tenderness.   Skin:     General: Skin is warm and dry.      Findings: Rash (Large cluster of erythematous skin lesions with excoriations on left side of the abdomen) present.   Neurological:      Mental Status: She is alert and oriented to person, place, and time.      Result Review :{ Labs  Result Review  Imaging  Med Tab  Media :23}   The following data was reviewed by: Sharifa Klein MD on 08/15/2023    Common labs          1/13/2023    11:50   Common Labs   Glucose 89    BUN 14    Creatinine 0.91    Sodium 137    Potassium 3.7    Chloride 98    Calcium 9.8    Albumin 4.5    Total Bilirubin 0.3    Alkaline Phosphatase 76    AST (SGOT) 29    ALT (SGPT) 25                Assessment and Plan {CC Problem List  Visit Diagnosis  ROS  Review (Popup)  Health Maintenance  Quality  BestPractice  Medications  SmartSets  SnapShot Encounters  Media :23}   Diagnoses and all orders for this visit:    1. " Essential hypertension (Primary)  -     CBC & Differential; Future  -     Comprehensive Metabolic Panel; Future    2. Current mild episode of major depressive disorder, unspecified whether recurrent    3. Restless legs syndrome    4. Neuropathy    5. Elevated glucose  -     Hemoglobin A1c; Future    6. Dermatitis  -     triamcinolone (KENALOG) 0.025 % cream; Apply 1 application  topically to the appropriate area as directed 2 (Two) Times a Day for 7 days.  Dispense: 45 g; Refill: 0    7. Encounter for screening mammogram for malignant neoplasm of breast  -     Mammo Screening Digital Tomosynthesis Bilateral With CAD; Future       Hypertension controlled, continue current medications  Check CBC and CMP  Depression symptoms managed with Wellbutrin XL  Continue Requip for restless legs  Continue gabapentin to help with neuropathy  Kenalog cream to help with dermatitis, uncertain etiology  Due for mammogram next month, ordered today      Follow Up {Instructions Charge Capture  Follow-up Communications :23}   Return in about 12 weeks (around 11/7/2023) for Recheck.  Patient was given instructions and counseling regarding her condition or for health maintenance advice. Please see specific information pulled into the AVS if appropriate.            This document has been electronically signed by Sharifa Klein MD

## 2023-08-28 ENCOUNTER — TELEPHONE (OUTPATIENT)
Dept: FAMILY MEDICINE CLINIC | Facility: CLINIC | Age: 68
End: 2023-08-28
Payer: MEDICARE

## 2023-08-28 NOTE — TELEPHONE ENCOUNTER
Per Dr. Klein, Ms. Llanos has been called with recent lab results & recommendations.  Continue current medications and follow-up as planned or sooner if any problems.     ----- Message from Sharifa Klein MD sent at 8/28/2023  1:59 PM CDT -----  Labs ok, but HgbA1c (3 month glucose average) increased to 5.7%.  Work on healthy diet and regular exercise.  Needs repeat testing  in about 6 months.  Thanks, LUANN Klein